# Patient Record
Sex: MALE | NOT HISPANIC OR LATINO
[De-identification: names, ages, dates, MRNs, and addresses within clinical notes are randomized per-mention and may not be internally consistent; named-entity substitution may affect disease eponyms.]

---

## 2017-01-11 ENCOUNTER — CHARTING TRANS (OUTPATIENT)
Dept: OTHER | Age: 22
End: 2017-01-11

## 2017-10-12 ENCOUNTER — CHARTING TRANS (OUTPATIENT)
Dept: OTHER | Age: 22
End: 2017-10-12

## 2017-10-16 ENCOUNTER — CHARTING TRANS (OUTPATIENT)
Dept: OTHER | Age: 22
End: 2017-10-16

## 2017-12-14 ENCOUNTER — CHARTING TRANS (OUTPATIENT)
Dept: OTHER | Age: 22
End: 2017-12-14

## 2018-01-08 ENCOUNTER — HOSPITAL ENCOUNTER (OUTPATIENT)
Age: 23
Discharge: HOME | End: 2018-01-08
Attending: PEDIATRICS
Payer: COMMERCIAL

## 2018-01-08 VITALS
TEMPERATURE: 99 F | HEIGHT: 65 IN | RESPIRATION RATE: 16 BRPM | BODY MASS INDEX: 23.66 KG/M2 | DIASTOLIC BLOOD PRESSURE: 78 MMHG | WEIGHT: 142 LBS | OXYGEN SATURATION: 99 % | SYSTOLIC BLOOD PRESSURE: 129 MMHG | HEART RATE: 82 BPM
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
## 2018-01-08 DIAGNOSIS — S61.258A DOG BITE OF INDEX FINGER, INITIAL ENCOUNTER: Primary | ICD-10-CM

## 2018-01-08 PROCEDURE — 99203 OFFICE O/P NEW LOW 30 MIN: CPT

## 2018-01-08 PROCEDURE — 99202 OFFICE O/P NEW SF 15 MIN: CPT

## 2018-01-08 RX ORDER — DEXTROAMPHETAMINE SACCHARATE, AMPHETAMINE ASPARTATE MONOHYDRATE, DEXTROAMPHETAMINE SULFATE AND AMPHETAMINE SULFATE 2.5; 2.5; 2.5; 2.5 MG/1; MG/1; MG/1; MG/1
10 CAPSULE, EXTENDED RELEASE ORAL EVERY MORNING
COMMUNITY

## 2018-01-08 RX ORDER — AMOXICILLIN AND CLAVULANATE POTASSIUM 875; 125 MG/1; MG/1
1 TABLET, FILM COATED ORAL 2 TIMES DAILY
Qty: 14 TABLET | Refills: 0 | Status: SHIPPED | OUTPATIENT
Start: 2018-01-08 | End: 2018-01-15

## 2018-01-08 NOTE — ED PROVIDER NOTES
Patient Seen in: [facility] Immediate Care In [address] [place] [address]    History   Patient presents with:  Bite (integumentary)    Stated Complaint: Dog Bite    HPI    Here for evaluation of a dog bite.   This occurred last night on his left index finger from his [id number]  [phone]      As needed      Medications Prescribed:  Current Discharge Medication List    START taking these medications    Amoxicillin-Pot Clavulanate 875-125 MG Oral Tab  Take 1 tablet by mouth 2 (two) times daily.   Qty: 14 tablet Refills: 0

## 2018-12-27 RX ORDER — CLINDAMYCIN PHOSPHATE AND BENZOYL PEROXIDE 10; 50 MG/G; MG/G
GEL TOPICAL
Qty: 45 G | Refills: 3 | Status: SHIPPED | OUTPATIENT
Start: 2018-12-27 | End: 2019-07-15 | Stop reason: SDUPTHER

## 2019-07-15 RX ORDER — CLINDAMYCIN PHOSPHATE AND BENZOYL PEROXIDE 10; 50 MG/G; MG/G
GEL TOPICAL
Qty: 45 G | Refills: 0 | Status: SHIPPED | OUTPATIENT
Start: 2019-07-15

## 2019-08-20 PROBLEM — F84.0 AUTISM SPECTRUM DISORDER: Status: ACTIVE | Noted: 2019-08-20

## 2019-08-20 PROBLEM — F41.1 GENERALIZED ANXIETY DISORDER: Status: ACTIVE | Noted: 2019-08-20

## 2019-08-20 PROBLEM — F90.9 ATTENTION DEFICIT HYPERACTIVITY DISORDER (ADHD), UNSPECIFIED ADHD TYPE: Status: ACTIVE | Noted: 2019-08-20

## 2019-08-20 PROBLEM — F44.9 CONVERSION DISORDER: Status: ACTIVE | Noted: 2019-08-20

## 2019-08-20 PROBLEM — F84.0 AUTISM SPECTRUM DISORDER (HCC): Status: ACTIVE | Noted: 2019-08-20

## 2019-09-12 ENCOUNTER — OFFICE VISIT (OUTPATIENT)
Dept: SURGERY | Facility: CLINIC | Age: 24
End: 2019-09-12
Payer: COMMERCIAL

## 2019-09-12 VITALS
BODY MASS INDEX: 25.61 KG/M2 | WEIGHT: 150 LBS | SYSTOLIC BLOOD PRESSURE: 120 MMHG | DIASTOLIC BLOOD PRESSURE: 70 MMHG | HEIGHT: 64 IN | HEART RATE: 84 BPM

## 2019-09-12 DIAGNOSIS — Q27.30 AVM (ARTERIOVENOUS MALFORMATION): Primary | ICD-10-CM

## 2019-09-12 PROCEDURE — 99244 OFF/OP CNSLTJ NEW/EST MOD 40: CPT

## 2019-09-12 NOTE — PROGRESS NOTES
New Pt is here for follow up AVM     Imaging MRI of the brain    Last Month Pt noticed lump on right temple. Pt denies issues with pain around the lump. Pt states that has no issues with swelling.      Review of Systems:    Hand Dominance: right  General: n

## 2019-09-12 NOTE — PROGRESS NOTES
[facility]  Interventional Neuroradiology Clinic Note      Estle Shone, MD  Neurosurgery  [facility], [place]  Primary Care      Date of Service: 9/12/2019    Dear Liban Ramey,     We had the pleasure of seeing Chris Quivers i constitutional symptoms, such as fevers, chills, night sweats, weight loss, chest pain, shortness of breath, gastrointestinal or genitourinary symptoms    Past Medical/Surgical History:  Diagnosis Date   • ADHD    • Anxiety    • Autism spectrum disorder testing. The patient's pupils are equally responsive and reactive to light. Extraocular muscles are intact. There is no evidence of afferent pupillary defect, anisocoria, myosis, or ptosis.   Facial expression and sensation are symmetric and intact bilat growth/enlargement, but will discuss the case at our multidisciplinary cerebrovascular conference at [facility] for consensus.   Additionally, we have recommended CTA head/neck studies for baseline assessment and confirmation of AVM diagnosis t

## 2019-09-16 ENCOUNTER — TELEPHONE (OUTPATIENT)
Dept: SURGERY | Facility: CLINIC | Age: 24
End: 2019-09-16

## 2019-09-16 NOTE — TELEPHONE ENCOUNTER
Called patient, mother answered phone, ok per HIP[employer]. Relayed below information from Radha .   Mother happy for the update

## 2019-09-16 NOTE — TELEPHONE ENCOUNTER
Case was not reviewed at conference on 9/13/2019    Will discuss case after CTA brain + carotids is completed.     He is scheduled to have imaging done on 9/26/19

## 2019-09-26 ENCOUNTER — HOSPITAL ENCOUNTER (OUTPATIENT)
Dept: CT IMAGING | Facility: HOSPITAL | Age: 24
Discharge: HOME | End: 2019-09-26
Payer: COMMERCIAL

## 2019-09-26 DIAGNOSIS — Q27.30 AVM (ARTERIOVENOUS MALFORMATION): ICD-10-CM

## 2019-09-26 LAB — CREAT BLD-MCNC: 0.9 MG/DL (ref 0.7–1.3)

## 2019-09-26 PROCEDURE — 82565 ASSAY OF CREATININE: CPT

## 2019-09-26 PROCEDURE — 70498 CT ANGIOGRAPHY NECK: CPT

## 2019-09-26 PROCEDURE — 70496 CT ANGIOGRAPHY HEAD: CPT

## 2019-10-03 ENCOUNTER — OFFICE VISIT (OUTPATIENT)
Dept: SURGERY | Facility: CLINIC | Age: 24
End: 2019-10-03
Payer: COMMERCIAL

## 2019-10-03 VITALS — SYSTOLIC BLOOD PRESSURE: 118 MMHG | DIASTOLIC BLOOD PRESSURE: 70 MMHG | HEART RATE: 82 BPM

## 2019-10-03 DIAGNOSIS — Q27.30 AVM (ARTERIOVENOUS MALFORMATION): Primary | ICD-10-CM

## 2019-10-03 PROCEDURE — 99212 OFFICE O/P EST SF 10 MIN: CPT | Performed by: PHYSICIAN ASSISTANT

## 2019-10-03 NOTE — PROGRESS NOTES
[facility]  Interventional Neuroradiology Clinic Note        Neurology  [facility]    Glendy Rooney MD  Primary Care      Date of Service:     Dear Mike Yepez,     We had the pleasure of seeing Babs Carey in the [place] at quiet times. This tinnitus does not interfere with ADLs.     The patient denies constitutional symptoms, such as fevers, chills, night sweats, weight loss, chest pain, shortness of breath, gastrointestinal or genitourinary symptoms    Past Medical/Surgi ptosis. Facial expression and sensation are symmetric and intact bilaterally in the V1-V3 distributions. The patient's hearing is grossly intact.   The palate elevates symmetrically and the voice is normal.  Sternocleidomastoid and trapezius muscle streng skiiing, etc.  Call clinic with any concerns such as change in size, pain, or if tinnitus worsens and impedes patient. Follow up with MRA head in 3 years. Thank you for allowing us to participate in the care of this very interesting patient.  Please do n

## 2019-10-03 NOTE — PROGRESS NOTES
Pt is here for follow up to review imaging/ AVM. Imaging: CTA of the brain obtained.      Review of Systems:    Hand Dominance: right  General: no symptoms reported  Neuro: no symptoms reported  Head: headache  Musculoskeletal: no symptoms reported  Car

## 2019-10-04 ENCOUNTER — TELEPHONE (OUTPATIENT)
Dept: SURGERY | Facility: CLINIC | Age: 24
End: 2019-10-04

## 2019-10-04 DIAGNOSIS — Q27.30 AVM (ARTERIOVENOUS MALFORMATION): Primary | ICD-10-CM

## 2019-10-04 NOTE — TELEPHONE ENCOUNTER
Per Dr. Aida Machuca / [facility] neurovascular conference recommendations     \"will follow patient with serial imaging.  Repeat noncontrast MRI/MRA brain at 1 (repeat August / September 2020), 2 (then 2021) , and then at the 4 (2023) year follow up\"    If there is an

## 2019-10-04 NOTE — TELEPHONE ENCOUNTER
Patient reminder placed     Per Dr. Leonardo Quijano / [facility] neurovascular conference recommendations      \"will follow patient with serial imaging.  Repeat noncontrast MRI/MRA brain at 1 (repeat August / September 2020), 2 (then 2021) , and then at the 4 (2023) year

## 2019-12-13 ENCOUNTER — HOSPITAL ENCOUNTER (OUTPATIENT)
Age: 24
Discharge: HOME | End: 2019-12-13
Attending: EMERGENCY MEDICINE
Payer: COMMERCIAL

## 2019-12-13 VITALS
RESPIRATION RATE: 18 BRPM | WEIGHT: 150 LBS | TEMPERATURE: 97 F | SYSTOLIC BLOOD PRESSURE: 119 MMHG | OXYGEN SATURATION: 100 % | BODY MASS INDEX: 25.61 KG/M2 | HEART RATE: 75 BPM | DIASTOLIC BLOOD PRESSURE: 74 MMHG | HEIGHT: 64 IN

## 2019-12-13 DIAGNOSIS — H10.12 ALLERGIC CONJUNCTIVITIS OF LEFT EYE: Primary | ICD-10-CM

## 2019-12-13 PROCEDURE — 99212 OFFICE O/P EST SF 10 MIN: CPT

## 2019-12-13 PROCEDURE — 99213 OFFICE O/P EST LOW 20 MIN: CPT

## 2019-12-13 RX ORDER — KETOTIFEN FUMARATE 0.35 MG/ML
1 SOLUTION/ DROPS OPHTHALMIC 2 TIMES DAILY
Qty: 1 BOTTLE | Refills: 0 | Status: SHIPPED | OUTPATIENT
Start: 2019-12-13 | End: 2020-09-04

## 2019-12-13 NOTE — ED INITIAL ASSESSMENT (HPI)
Pt with redness and drainage to eye  Symptoms started on Wednesday night  Pt reports possible dust flew into eye at work  +drainage and swelling on Thursday  Pt reports symptoms are better today but just wants to make sure because he wears contacts

## 2019-12-14 NOTE — ED PROVIDER NOTES
Patient Seen in: [facility] Immediate Care In [address] [place] [address]    History   Patient presents with:  Eye Problem    Stated Complaint: red eye     HPI    Pt complains of red left eye  for 3 days . no trauma. no uri symptoms. no visual changes.   Associated Gen: pt is alert, no obvious distress  Eyelids: normal  Conjunctiva: normal, white conjunctiva b/l  Cornea: normal  EOMI intact PERRLA  Ant chambers: nl inspection    ENT:  mmm, no lesions  Neck: supple, no LAD  Skin: warm and dry, no rash, no lacera

## 2020-08-03 ENCOUNTER — TELEPHONE (OUTPATIENT)
Dept: SURGERY | Facility: CLINIC | Age: 25
End: 2020-08-03

## 2020-08-03 NOTE — TELEPHONE ENCOUNTER
Orders for imaging MRA/ MRI of the brain have already been placed per referrals for imaging is still open.      Sent to PA team for imaging approval.

## 2020-09-04 PROBLEM — Q27.30 AVM (ARTERIOVENOUS MALFORMATION): Status: ACTIVE | Noted: 2020-09-04

## 2020-09-04 PROBLEM — Q27.30 AVM (ARTERIOVENOUS MALFORMATION) (HCC): Status: ACTIVE | Noted: 2020-09-04

## 2020-11-23 RX ORDER — CLINDAMYCIN PHOSPHATE AND BENZOYL PEROXIDE 10; 50 MG/G; MG/G
GEL TOPICAL
Qty: 45 G | Refills: 0 | OUTPATIENT
Start: 2020-11-23

## 2020-12-10 RX ORDER — CLINDAMYCIN PHOSPHATE AND BENZOYL PEROXIDE 10; 50 MG/G; MG/G
GEL TOPICAL
Qty: 45 G | Refills: 0 | OUTPATIENT
Start: 2020-12-10

## 2021-08-10 ENCOUNTER — TELEPHONE (OUTPATIENT)
Dept: SURGERY | Facility: CLINIC | Age: 26
End: 2021-08-10

## 2021-08-10 DIAGNOSIS — Q27.30 AVM (ARTERIOVENOUS MALFORMATION): Primary | ICD-10-CM

## 2021-08-10 NOTE — TELEPHONE ENCOUNTER
----- Message from Domi Khan RN sent at 8/3/2020 10:12 AM CDT -----  Regarding: Imaging reminder  ROSALVA [facility] [address] Nurse on 8/1/20  Per Dr. Amaury Dawson / [facility] neurovascular conference recommendations :      \"will follow patient with serial

## 2021-09-09 ENCOUNTER — HOSPITAL ENCOUNTER (OUTPATIENT)
Dept: MRI IMAGING | Facility: HOSPITAL | Age: 26
Discharge: HOME | End: 2021-09-09
Payer: MEDICAID

## 2021-09-09 DIAGNOSIS — Q27.30 AVM (ARTERIOVENOUS MALFORMATION): ICD-10-CM

## 2021-09-09 PROCEDURE — 70544 MR ANGIOGRAPHY HEAD W/O DYE: CPT

## 2021-12-13 ENCOUNTER — APPOINTMENT (OUTPATIENT)
Dept: GENERAL RADIOLOGY | Age: 26
End: 2021-12-13
Attending: PHYSICIAN ASSISTANT
Payer: MEDICAID

## 2021-12-13 ENCOUNTER — HOSPITAL ENCOUNTER (OUTPATIENT)
Age: 26
Discharge: HOME | End: 2021-12-13
Payer: MEDICAID

## 2021-12-13 VITALS
DIASTOLIC BLOOD PRESSURE: 66 MMHG | RESPIRATION RATE: 20 BRPM | OXYGEN SATURATION: 100 % | HEART RATE: 84 BPM | TEMPERATURE: 99 F | SYSTOLIC BLOOD PRESSURE: 129 MMHG

## 2021-12-13 DIAGNOSIS — M25.572 ACUTE LEFT ANKLE PAIN: Primary | ICD-10-CM

## 2021-12-13 PROCEDURE — 99213 OFFICE O/P EST LOW 20 MIN: CPT

## 2021-12-13 PROCEDURE — 73610 X-RAY EXAM OF ANKLE: CPT | Performed by: PHYSICIAN ASSISTANT

## 2021-12-13 RX ORDER — IBUPROFEN 600 MG/1
TABLET ORAL
Qty: 20 TABLET | Refills: 0 | Status: SHIPPED | OUTPATIENT
Start: 2021-12-13

## 2021-12-13 NOTE — ED INITIAL ASSESSMENT (HPI)
Left ankle pain for 1 month. No specific injury. \"Stands a lot\" at work. Wearing a brace for support with some relief. + distal CMS.

## 2021-12-13 NOTE — ED PROVIDER NOTES
Patient Seen in: Immediate [facility] [place]    History   Patient presents with:  Leg or Foot Injury: Left ankle might be sprained. Need X-ray - Entered by patient    Stated Complaint: Leg or Foot Injury - Left ankle might be sprained.  Need X-ray    [facility] reviewed and negative except as noted above. PSFH elements reviewed from today and agreed except as otherwise stated in HPI.     Physical Exam     ED Triage Vitals [12/13/21 1423]   /66   Pulse 84   Resp 20   Temp 98.7 °F (37.1 °C)   Temp src Tempo Remainder of musculoskeletal system is grossly intact. There is no obvious deformity. Neurological: Gross motor movement is intact in all 4 extremities. Patient exhibits normal speech. Skin: Skin is normal to inspection. Warm and dry.   No obvious brui

## 2021-12-16 ENCOUNTER — APPOINTMENT (OUTPATIENT)
Dept: URBAN - METROPOLITAN AREA CLINIC 321 | Age: 26
Setting detail: DERMATOLOGY
End: 2021-12-16

## 2021-12-16 DIAGNOSIS — D22 MELANOCYTIC NEVI: ICD-10-CM

## 2021-12-16 DIAGNOSIS — L70.0 ACNE VULGARIS: ICD-10-CM

## 2021-12-16 PROBLEM — D48.5 NEOPLASM OF UNCERTAIN BEHAVIOR OF SKIN: Status: ACTIVE | Noted: 2021-12-16

## 2021-12-16 PROCEDURE — OTHER COUNSELING: OTHER

## 2021-12-16 PROCEDURE — 11104 PUNCH BX SKIN SINGLE LESION: CPT

## 2021-12-16 PROCEDURE — OTHER PRESCRIPTION: OTHER

## 2021-12-16 PROCEDURE — OTHER BIOPSY BY PUNCH METHOD: OTHER

## 2021-12-16 PROCEDURE — 99204 OFFICE O/P NEW MOD 45 MIN: CPT | Mod: 25

## 2021-12-16 PROCEDURE — OTHER PRESCRIPTION MEDICATION MANAGEMENT: OTHER

## 2021-12-16 RX ORDER — CLINDAMYCIN PHOSPHATE AND BENZOYL PEROXIDE 10; 50 MG/G; MG/G
GEL TOPICAL
Qty: 25 | Refills: 3 | Status: ERX | COMMUNITY
Start: 2021-12-16

## 2021-12-16 ASSESSMENT — LOCATION SIMPLE DESCRIPTION DERM
LOCATION SIMPLE: RIGHT CHEEK
LOCATION SIMPLE: NOSE
LOCATION SIMPLE: LEFT FOREHEAD
LOCATION SIMPLE: LEFT CHEEK
LOCATION SIMPLE: RIGHT FOREHEAD

## 2021-12-16 ASSESSMENT — LOCATION DETAILED DESCRIPTION DERM
LOCATION DETAILED: NASAL DORSUM
LOCATION DETAILED: RIGHT MEDIAL FOREHEAD
LOCATION DETAILED: LEFT CENTRAL MALAR CHEEK
LOCATION DETAILED: LEFT FOREHEAD
LOCATION DETAILED: RIGHT CENTRAL MALAR CHEEK

## 2021-12-16 ASSESSMENT — LOCATION ZONE DERM
LOCATION ZONE: FACE
LOCATION ZONE: NOSE

## 2021-12-16 NOTE — PROCEDURE: COUNSELING
Topical Clindamycin Pregnancy And Lactation Text: This medication is Pregnancy Category B and is considered safe during pregnancy. It is unknown if it is excreted in breast milk.
Tazorac Counseling:  Patient advised that medication is irritating and drying.  Patient may need to apply sparingly and wash off after an hour before eventually leaving it on overnight.  The patient verbalized understanding of the proper use and possible adverse effects of tazorac.  All of the patient's questions and concerns were addressed.
Topical Sulfur Applications Counseling: Topical Sulfur Counseling: Patient counseled that this medication may cause skin irritation or allergic reactions.  In the event of skin irritation, the patient was advised to reduce the amount of the drug applied or use it less frequently.   The patient verbalized understanding of the proper use and possible adverse effects of topical sulfur application.  All of the patient's questions and concerns were addressed.
Topical Clindamycin Counseling: Patient counseled that this medication may cause skin irritation or allergic reactions.  In the event of skin irritation, the patient was advised to reduce the amount of the drug applied or use it less frequently.   The patient verbalized understanding of the proper use and possible adverse effects of clindamycin.  All of the patient's questions and concerns were addressed.
Bactrim Counseling:  I discussed with the patient the risks of sulfa antibiotics including but not limited to GI upset, allergic reaction, drug rash, diarrhea, dizziness, photosensitivity, and yeast infections.  Rarely, more serious reactions can occur including but not limited to aplastic anemia, agranulocytosis, methemoglobinemia, blood dyscrasias, liver or kidney failure, lung infiltrates or desquamative/blistering drug rashes.
Topical Retinoids Recommendations: Rec OTC adapalene gel 0.1% (i.e differin) if Rx is not covered.
Topical Sulfur Applications Pregnancy And Lactation Text: This medication is Pregnancy Category C and has an unknown safety profile during pregnancy. It is unknown if this topical medication is excreted in breast milk.
Topical Retinoid counseling:  Patient advised to apply a pea-sized amount only at bedtime and wait 30 minutes after washing their face before applying.  If too drying, patient may add a non-comedogenic moisturizer. The patient verbalized understanding of the proper use and possible adverse effects of retinoids.  All of the patient's questions and concerns were addressed.
Detail Level: Detailed
Tetracycline Pregnancy And Lactation Text: This medication is Pregnancy Category D and not consider safe during pregnancy. It is also excreted in breast milk.
Bpo Recommendations: Recommend panoxyl or cerave 4% BPO wash on face for 30-45 seconds daily or as tolerated (reduce use if drying/irritating to face). Can bleach fabrics/hair.  Recommend 10% BPO wash (i.e panoxyl) for body for 2-3 minutes daily, adjusting frequency/duration as tolerated.
Tazorac Pregnancy And Lactation Text: This medication is not safe during pregnancy. It is unknown if this medication is excreted in breast milk.
Doxycycline Counseling:  Patient counseled regarding possible photosensitivity and increased risk for sunburn.  Patient instructed to avoid sunlight, if possible.  When exposed to sunlight, patients should wear protective clothing, sunglasses, and sunscreen.  The patient was instructed to call the office immediately if the following severe adverse effects occur:  hearing changes, easy bruising/bleeding, severe headache, or vision changes.  The patient verbalized understanding of the proper use and possible adverse effects of doxycycline.  All of the patient's questions and concerns were addressed.
Erythromycin Counseling:  I discussed with the patient the risks of erythromycin including but not limited to GI upset, allergic reaction, drug rash, diarrhea, increase in liver enzymes, and yeast infections.
Tetracycline Counseling: Patient counseled regarding possible photosensitivity and increased risk for sunburn.  Patient instructed to avoid sunlight, if possible.  When exposed to sunlight, patients should wear protective clothing, sunglasses, and sunscreen.  The patient was instructed to call the office immediately if the following severe adverse effects occur:  hearing changes, easy bruising/bleeding, severe headache, or vision changes.  The patient verbalized understanding of the proper use and possible adverse effects of tetracycline.  All of the patient's questions and concerns were addressed. Patient understands to avoid pregnancy while on therapy due to potential birth defects.
Sarecycline Counseling: Patient advised regarding possible photosensitivity and discoloration of the teeth, skin, lips, tongue and gums.  Patient instructed to avoid sunlight, if possible.  When exposed to sunlight, patients should wear protective clothing, sunglasses, and sunscreen.  The patient was instructed to call the office immediately if the following severe adverse effects occur:  hearing changes, easy bruising/bleeding, severe headache, or vision changes.  The patient verbalized understanding of the proper use and possible adverse effects of sarecycline.  All of the patient's questions and concerns were addressed.
Benzoyl Peroxide Counseling: Patient counseled that medicine may cause skin irritation and bleach clothing.  In the event of skin irritation, the patient was advised to reduce the amount of the drug applied or use it less frequently.   The patient verbalized understanding of the proper use and possible adverse effects of benzoyl peroxide.  All of the patient's questions and concerns were addressed.
Dapsone Pregnancy And Lactation Text: This medication is Pregnancy Category C and is not considered safe during pregnancy or breast feeding.
Spironolactone Counseling: Patient advised regarding risks of diarrhea, abdominal pain, hyperkalemia, birth defects (for female patients), liver toxicity and renal toxicity. The patient may need blood work to monitor liver and kidney function and potassium levels while on therapy. The patient verbalized understanding of the proper use and possible adverse effects of spironolactone.  All of the patient's questions and concerns were addressed.
Isotretinoin Counseling: Patient should get monthly blood tests, not donate blood, not drive at night if vision affected, not share medication, and not undergo elective surgery for 6 months after tx completed. Side effects reviewed, pt to contact office should one occur.
High Dose Vitamin A Counseling: Side effects reviewed, pt to contact office should one occur.
Dapsone Counseling: I discussed with the patient the risks of dapsone including but not limited to hemolytic anemia, agranulocytosis, rashes, methemoglobinemia, kidney failure, peripheral neuropathy, headaches, GI upset, and liver toxicity.  Patients who start dapsone require monitoring including baseline LFTs and weekly CBCs for the first month, then every month thereafter.  The patient verbalized understanding of the proper use and possible adverse effects of dapsone.  All of the patient's questions and concerns were addressed.
Isotretinoin Pregnancy And Lactation Text: This medication is Pregnancy Category X and is considered extremely dangerous during pregnancy. It is unknown if it is excreted in breast milk.
Topical Retinoid Pregnancy And Lactation Text: This medication is Pregnancy Category C. It is unknown if this medication is excreted in breast milk.
Spironolactone Pregnancy And Lactation Text: This medication can cause feminization of the male fetus and should be avoided during pregnancy. The active metabolite is also found in breast milk.
High Dose Vitamin A Pregnancy And Lactation Text: High dose vitamin A therapy is contraindicated during pregnancy and breast feeding.
Erythromycin Pregnancy And Lactation Text: This medication is Pregnancy Category B and is considered safe during pregnancy. It is also excreted in breast milk.
Azithromycin Pregnancy And Lactation Text: This medication is considered safe during pregnancy and is also secreted in breast milk.
Doxycycline Pregnancy And Lactation Text: This medication is Pregnancy Category D and not consider safe during pregnancy. It is also excreted in breast milk but is considered safe for shorter treatment courses.
Use Enhanced Medication Counseling?: No
Minocycline Counseling: Patient advised regarding possible photosensitivity and discoloration of the teeth, skin, lips, tongue and gums.  Patient instructed to avoid sunlight, if possible.  When exposed to sunlight, patients should wear protective clothing, sunglasses, and sunscreen.  The patient was instructed to call the office immediately if the following severe adverse effects occur:  hearing changes, easy bruising/bleeding, severe headache, or vision changes.  The patient verbalized understanding of the proper use and possible adverse effects of minocycline.  All of the patient's questions and concerns were addressed.
Bactrim Pregnancy And Lactation Text: This medication is Pregnancy Category D and is known to cause fetal risk.  It is also excreted in breast milk.
Birth Control Pills Pregnancy And Lactation Text: This medication should be avoided if pregnant and for the first 30 days post-partum.
Benzoyl Peroxide Pregnancy And Lactation Text: This medication is Pregnancy Category C. It is unknown if benzoyl peroxide is excreted in breast milk.
Birth Control Pills Counseling: Birth Control Pill Counseling: I discussed with the patient the potential side effects of OCPs including but not limited to increased risk of stroke, heart attack, thrombophlebitis, deep venous thrombosis, hepatic adenomas, breast changes, GI upset, headaches, and depression.  The patient verbalized understanding of the proper use and possible adverse effects of OCPs. All of the patient's questions and concerns were addressed.
Azithromycin Counseling:  I discussed with the patient the risks of azithromycin including but not limited to GI upset, allergic reaction, drug rash, diarrhea, and yeast infections.

## 2021-12-16 NOTE — PROCEDURE: BIOPSY BY PUNCH METHOD
Patient Will Remove Sutures At Home?: No
Post-Care Instructions: I reviewed with the patient in detail post-care instructions. Patient is to keep the biopsy site dry overnight, and then apply vaseline once-twice daily until healed.
Anesthesia Type: 1% lidocaine with epinephrine
Wound Care: Petrolatum
Biopsy Type: H and E
Was A Bandage Applied: Yes
Additional Anesthesia Volume In Cc (Will Not Render If 0): 0
Detail Level: Detailed
Information: Selecting Yes will display possible errors in your note based on the variables you have selected. This validation is only offered as a suggestion for you. PLEASE NOTE THAT THE VALIDATION TEXT WILL BE REMOVED WHEN YOU FINALIZE YOUR NOTE. IF YOU WANT TO FAX A PRELIMINARY NOTE YOU WILL NEED TO TOGGLE THIS TO 'NO' IF YOU DO NOT WANT IT IN YOUR FAXED NOTE.
Hemostasis: None
Punch Size In Mm: 3
Notification Instructions: Patient will be notified of biopsy results. However, patient instructed to call the office if not contacted within 2 weeks.
Billing Type: Third-Party Bill
Home Suture Removal Text: Patient was provided a home suture removal kit and will remove their sutures at home.  If they have any questions or difficulties they will call the office.
Suture Removal: 7 days
Consent: Written consent was obtained and risks were reviewed including but not limited to scarring, infection, bleeding, scabbing, incomplete removal, nerve damage and allergy to anesthesia.
Dressing: bandage
Anesthesia Volume In Cc (Will Not Render If 0): 0.5
Epidermal Sutures: 6-0 Nylon

## 2021-12-23 ENCOUNTER — APPOINTMENT (OUTPATIENT)
Dept: URBAN - METROPOLITAN AREA CLINIC 321 | Age: 26
Setting detail: DERMATOLOGY
End: 2021-12-23

## 2021-12-23 DIAGNOSIS — D22 MELANOCYTIC NEVI: ICD-10-CM

## 2021-12-23 PROBLEM — D22.39 MELANOCYTIC NEVI OF OTHER PARTS OF FACE: Status: ACTIVE | Noted: 2021-12-23

## 2021-12-23 PROCEDURE — OTHER COUNSELING: OTHER

## 2021-12-23 PROCEDURE — OTHER SUTURE REMOVAL (NO GLOBAL PERIOD): OTHER

## 2021-12-23 PROCEDURE — OTHER PATHOLOGY DISCUSSION: OTHER

## 2021-12-23 PROCEDURE — 99212 OFFICE O/P EST SF 10 MIN: CPT

## 2021-12-23 ASSESSMENT — LOCATION ZONE DERM: LOCATION ZONE: NOSE

## 2021-12-23 ASSESSMENT — LOCATION SIMPLE DESCRIPTION DERM: LOCATION SIMPLE: NOSE

## 2021-12-23 ASSESSMENT — LOCATION DETAILED DESCRIPTION DERM: LOCATION DETAILED: NASAL DORSUM

## 2021-12-23 NOTE — PROCEDURE: COUNSELING
Sunscreen Recommendations: Recommend at least SPF 30 use daily. Reapply every 2 hours or more frequently if sweating/exercising or water exposure. Recommend broad spectrum sunscreen. Zinc oxide and titanium dioxide formulations are preferred over \"chemical\" based sunscreens. Wear a wide brimmed hat and sun protective clothing.
Detail Level: Detailed

## 2021-12-27 ENCOUNTER — OFFICE VISIT (OUTPATIENT)
Dept: ORTHOPEDICS CLINIC | Facility: CLINIC | Age: 26
End: 2021-12-27
Payer: MEDICAID

## 2021-12-27 DIAGNOSIS — M25.572 ACUTE LEFT ANKLE PAIN: Primary | ICD-10-CM

## 2021-12-27 PROCEDURE — 99203 OFFICE O/P NEW LOW 30 MIN: CPT | Performed by: PHYSICIAN ASSISTANT

## 2021-12-27 NOTE — PROGRESS NOTES
NURSING INTAKE COMMENTS: Patient presents with: Ankle Pain: left ankle, not sure if there was an injury, has xrays in system, has been for 1 month now, pain at a 9/10       HPI: This 32year old male presents today with complaints of left ankle pain.   He feels generally well, no significant weight loss or weight gain  SKIN: no ulcerated or worrisome skin lesions  EYES:denies blurred vision or double vision  HEENT: denies new nasal congestion, sinus pain or ST  LUNGS: denies shortness of breath  CARDIOVASCU 85 10/06/2020    BUN 16.0 10/06/2020    CREATSERUM 0.92 10/06/2020    GFRNAA 119 09/26/2019    GFRAA 138 09/26/2019        Assessment and Plan:  Diagnoses and all orders for this visit:    Acute left ankle pain  -     PHYSICAL THERAPY - INTERNAL          P

## 2022-07-07 ENCOUNTER — TELEPHONE (OUTPATIENT)
Dept: SURGERY | Facility: CLINIC | Age: 27
End: 2022-07-07

## 2022-07-07 DIAGNOSIS — Q27.30 AVM (ARTERIOVENOUS MALFORMATION): Primary | ICD-10-CM

## 2022-07-07 NOTE — TELEPHONE ENCOUNTER
Mom is concerned that extracranial AVM is growing in size. She denies that the patient is experiencing any new or worsening symptoms. Order placed for repeat MRA brain. Mother was instructed to have patient obtain both MRA brain and previously ordered MRI brain. We can reschedule patient for follow up apt which imaging has been obtained. Thank you.

## 2022-08-11 ENCOUNTER — HOSPITAL ENCOUNTER (OUTPATIENT)
Dept: MRI IMAGING | Facility: HOSPITAL | Age: 27
Discharge: HOME | End: 2022-08-11
Payer: MEDICAID

## 2022-08-11 DIAGNOSIS — Q27.30 AVM (ARTERIOVENOUS MALFORMATION): ICD-10-CM

## 2022-08-11 PROCEDURE — 70551 MRI BRAIN STEM W/O DYE: CPT

## 2022-08-11 PROCEDURE — 70544 MR ANGIOGRAPHY HEAD W/O DYE: CPT

## 2022-08-15 ENCOUNTER — TELEPHONE (OUTPATIENT)
Dept: SURGERY | Facility: CLINIC | Age: 27
End: 2022-08-15

## 2022-08-15 NOTE — TELEPHONE ENCOUNTER
Per QUAN Monk \"Please call patient/mother and schedule follow up with Dr. Ari Walton to discuss most recent imaging findings. \"      Called mother to relay results. Scheduled apt for 9-8 with Dr Ari Walton.

## 2022-09-08 ENCOUNTER — OFFICE VISIT (OUTPATIENT)
Dept: SURGERY | Facility: CLINIC | Age: 27
End: 2022-09-08
Payer: MEDICAID

## 2022-09-08 VITALS — DIASTOLIC BLOOD PRESSURE: 60 MMHG | HEART RATE: 88 BPM | SYSTOLIC BLOOD PRESSURE: 110 MMHG

## 2022-09-08 DIAGNOSIS — Q27.30 AVM (ARTERIOVENOUS MALFORMATION): Primary | ICD-10-CM

## 2022-09-08 PROCEDURE — 3078F DIAST BP <80 MM HG: CPT

## 2022-09-08 PROCEDURE — 3074F SYST BP LT 130 MM HG: CPT

## 2022-09-08 PROCEDURE — 99214 OFFICE O/P EST MOD 30 MIN: CPT

## 2022-09-08 RX ORDER — CLINDAMYCIN AND BENZOYL PEROXIDE 10; 50 MG/G; MG/G
1 GEL TOPICAL AS DIRECTED
COMMUNITY

## 2022-09-08 NOTE — PROGRESS NOTES
Patient here for a follow up visit with Dr. Hiral Shultz to review MRA results. Patient has a history of AVM and pulsatile tinnitus. Last office visit with provider in [address] [place] [address] was on 10/3/19, when Dr. Hiral Shultz recommended f/u MRA in 3 years. Patient completed MRA on 8/11/22.      Review of Systems:    Hand Dominance: right  General: no symptoms reported  Neuro: no symptoms reported  Head: ringing in ears and pulsatile tinnitus  Musculoskeletal: no symptoms reported  Cardiovascular: no symptoms reported  Gastrointestinal: no symptoms reported  Genitourinary: no symptoms reported  Respiratory: no symptoms reported  Eyes: no symptoms reported  Skin: no symptoms reported  Mouth & throat: no symptoms reported  Neck: no symptoms reported  Nose: no symptoms reported  Psychiatric: anxiety and moodiness     MRS-0  Barthel-100

## 2022-09-19 ENCOUNTER — TELEPHONE (OUTPATIENT)
Dept: SURGERY | Facility: CLINIC | Age: 27
End: 2022-09-19

## 2022-09-19 DIAGNOSIS — Q27.30 AVM (ARTERIOVENOUS MALFORMATION): Primary | ICD-10-CM

## 2022-09-19 NOTE — TELEPHONE ENCOUNTER
Patient is scheduled for diagnostic cerebral angiogram on 10/26/22 with Dr Nano Sandra. Y    form completed  Y   Surgery order signed   --- Placed sx on surgery sheet  Y   Placed on outlook calendar  Y   [employer] message sent to patient with sx instructions  N/A Faxed pre-op clearance request to PCP   Y   E-mail sent to [employer]  ---Not avail. Post-op appointments made-Nov. schedule not available as of today. Reminder placed to call to schedule. Y   [place] Medicaid. Routed to PA team to initiate. Y   3 day follow up reminder placed. While discussing pre-procedure steps with patient's mother Lucero Chauhan, she stated that:  -patient works at [employer] about 5 days a week unmasked.   -would it be beneficial for patient to take time off of work, pre-procedure to reduce his risk of evelyn Covid prior to angiogram?  -if provider deems this advisable, then Lucero Chauhan has requested a letter be written and be made accessible via [employer] so that patient may provide letter to manager. Message routed to BAR Collado as inquiry for above.

## 2022-09-19 NOTE — TELEPHONE ENCOUNTER
Per Nursing informed mother that RN will be calling her later Monday 9/19 morning to schedule the angiogram.  Mother appreciative and will wait for call.

## 2022-09-20 NOTE — TELEPHONE ENCOUNTER
Called [facility] [phone] and and spoke with Meenu Nowak.     Prior authorization request completed for:   diagnostic cerebral angiogram   Authorization #NO AUTHORIZATION REQUIRED  Authorization dates: 10/26/22  CPT codes approved: 06997,85217,[id number]  Number of visits/dates of service approved: 0 outpatient  Physician: Dr Marlene Xiong  Location: [facility]

## 2022-09-20 NOTE — TELEPHONE ENCOUNTER
Taking time off prior to angiogram to limit exposure is not something that we generally recommend. People can be asymptomatic with COVID. Also there is conflicting information that the incubation period is anywhere from 2-14 days. This is not an emergent angiogram; however, if his mom would like to do this we can provide a letter, but this is not something that is required. Thank you.

## 2022-09-22 NOTE — TELEPHONE ENCOUNTER
Received feedback from provider regarding pre-angiogram Covid testing and taking time off of work prior to procedure. Received information that no authorization is needed for insurance coverage. Called patient's mother Tyler Frazier and discussed recommendations per BAR Villegas. Per Tyler Frazier, they will consider feedback and will most likely have patient mask at work on days prior to angiogram.      Tyler Frazier thanked Nursing for the discussion and the information and the call was ended. Noted that post-procedure appointment still needs to be scheduled, as Dr. Ashley Mendez November schedule is not yet available.

## 2022-10-13 ENCOUNTER — LAB ENCOUNTER (OUTPATIENT)
Dept: LAB | Facility: HOSPITAL | Age: 27
End: 2022-10-13
Payer: MEDICAID

## 2022-10-13 DIAGNOSIS — Q27.30 AVM (ARTERIOVENOUS MALFORMATION): ICD-10-CM

## 2022-10-13 LAB
ANION GAP SERPL CALC-SCNC: 6 MMOL/L (ref 0–18)
APTT PPP: 30 SECONDS (ref 23.3–35.6)
BASOPHILS # BLD AUTO: 0.1 X10(3) UL (ref 0–0.2)
BASOPHILS NFR BLD AUTO: 1.3 %
BUN BLD-MCNC: 12 MG/DL (ref 7–18)
BUN/CREAT SERPL: 11.9 (ref 10–20)
CALCIUM BLD-MCNC: 9.4 MG/DL (ref 8.5–10.1)
CHLORIDE SERPL-SCNC: 108 MMOL/L (ref 98–112)
CO2 SERPL-SCNC: 27 MMOL/L (ref 21–32)
CREAT BLD-MCNC: 1.01 MG/DL
DEPRECATED RDW RBC AUTO: 40.5 FL (ref 35.1–46.3)
EOSINOPHIL # BLD AUTO: 0.31 X10(3) UL (ref 0–0.7)
EOSINOPHIL NFR BLD AUTO: 4.2 %
ERYTHROCYTE [DISTWIDTH] IN BLOOD BY AUTOMATED COUNT: 12.5 % (ref 11–15)
FASTING STATUS PATIENT QL REPORTED: YES
GFR SERPLBLD BASED ON 1.73 SQ M-ARVRAT: 105 ML/MIN/1.73M2 (ref 60–?)
GLUCOSE BLD-MCNC: 77 MG/DL (ref 70–99)
HCT VFR BLD AUTO: 48.1 %
HGB BLD-MCNC: 15.7 G/DL
IMM GRANULOCYTES # BLD AUTO: 0.01 X10(3) UL (ref 0–1)
IMM GRANULOCYTES NFR BLD: 0.1 %
INR BLD: 0.99 (ref 0.85–1.16)
LYMPHOCYTES # BLD AUTO: 2.54 X10(3) UL (ref 1–4)
LYMPHOCYTES NFR BLD AUTO: 34.1 %
MCH RBC QN AUTO: 28.7 PG (ref 26–34)
MCHC RBC AUTO-ENTMCNC: 32.6 G/DL (ref 31–37)
MCV RBC AUTO: 87.9 FL
MONOCYTES # BLD AUTO: 0.68 X10(3) UL (ref 0.1–1)
MONOCYTES NFR BLD AUTO: 9.1 %
NEUTROPHILS # BLD AUTO: 3.81 X10 (3) UL (ref 1.5–7.7)
NEUTROPHILS # BLD AUTO: 3.81 X10(3) UL (ref 1.5–7.7)
NEUTROPHILS NFR BLD AUTO: 51.2 %
OSMOLALITY SERPL CALC.SUM OF ELEC: 291 MOSM/KG (ref 275–295)
PLATELET # BLD AUTO: 287 10(3)UL (ref 150–450)
POTASSIUM SERPL-SCNC: 4.2 MMOL/L (ref 3.5–5.1)
PROTHROMBIN TIME: 13 SECONDS (ref 11.6–14.8)
RBC # BLD AUTO: 5.47 X10(6)UL
SODIUM SERPL-SCNC: 141 MMOL/L (ref 136–145)
WBC # BLD AUTO: 7.5 X10(3) UL (ref 4–11)

## 2022-10-13 PROCEDURE — 80048 BASIC METABOLIC PNL TOTAL CA: CPT

## 2022-10-13 PROCEDURE — 85610 PROTHROMBIN TIME: CPT

## 2022-10-13 PROCEDURE — 36415 COLL VENOUS BLD VENIPUNCTURE: CPT

## 2022-10-13 PROCEDURE — 85730 THROMBOPLASTIN TIME PARTIAL: CPT

## 2022-10-13 PROCEDURE — 85025 COMPLETE CBC W/AUTO DIFF WBC: CPT

## 2022-10-23 ENCOUNTER — LAB ENCOUNTER (OUTPATIENT)
Dept: LAB | Facility: HOSPITAL | Age: 27
End: 2022-10-23
Payer: MEDICAID

## 2022-10-23 DIAGNOSIS — Q27.30 AVM (ARTERIOVENOUS MALFORMATION): ICD-10-CM

## 2022-10-23 LAB — SARS-COV-2 RNA RESP QL NAA+PROBE: NOT DETECTED

## 2022-10-24 VITALS — WEIGHT: 160 LBS | BODY MASS INDEX: 26.66 KG/M2 | HEIGHT: 65 IN

## 2022-10-26 ENCOUNTER — HOSPITAL ENCOUNTER (OUTPATIENT)
Dept: INTERVENTIONAL RADIOLOGY/VASCULAR | Facility: HOSPITAL | Age: 27
Discharge: HOME | End: 2022-10-26
Payer: MEDICAID

## 2022-11-09 ENCOUNTER — HOSPITAL ENCOUNTER (OUTPATIENT)
Dept: INTERVENTIONAL RADIOLOGY/VASCULAR | Facility: HOSPITAL | Age: 27
Discharge: HOME | End: 2022-11-09
Payer: MEDICAID

## 2022-11-09 ENCOUNTER — TELEPHONE (OUTPATIENT)
Dept: SURGERY | Facility: CLINIC | Age: 27
End: 2022-11-09

## 2022-11-09 VITALS
TEMPERATURE: 99 F | HEART RATE: 70 BPM | OXYGEN SATURATION: 99 % | RESPIRATION RATE: 18 BRPM | SYSTOLIC BLOOD PRESSURE: 99 MMHG | DIASTOLIC BLOOD PRESSURE: 58 MMHG

## 2022-11-09 DIAGNOSIS — Q27.30 AVM (ARTERIOVENOUS MALFORMATION): ICD-10-CM

## 2022-11-09 LAB — SARS-COV-2 RNA RESP QL NAA+PROBE: NOT DETECTED

## 2022-11-09 PROCEDURE — B318YZZ FLUOROSCOPY OF BILATERAL INTERNAL CAROTID ARTERIES USING OTHER CONTRAST: ICD-10-PCS

## 2022-11-09 PROCEDURE — B31DYZZ FLUOROSCOPY OF RIGHT VERTEBRAL ARTERY USING OTHER CONTRAST: ICD-10-PCS

## 2022-11-09 PROCEDURE — B31CYZZ FLUOROSCOPY OF BILATERAL EXTERNAL CAROTID ARTERIES USING OTHER CONTRAST: ICD-10-PCS

## 2022-11-09 PROCEDURE — 99153 MOD SED SAME PHYS/QHP EA: CPT

## 2022-11-09 PROCEDURE — 36224 PLACE CATH CAROTD ART: CPT

## 2022-11-09 PROCEDURE — 36226 PLACE CATH VERTEBRAL ART: CPT

## 2022-11-09 PROCEDURE — 76377 3D RENDER W/INTRP POSTPROCES: CPT

## 2022-11-09 PROCEDURE — 99152 MOD SED SAME PHYS/QHP 5/>YRS: CPT

## 2022-11-09 PROCEDURE — 36227 PLACE CATH XTRNL CAROTID: CPT

## 2022-11-09 RX ORDER — LIDOCAINE HYDROCHLORIDE 10 MG/ML
INJECTION, SOLUTION INFILTRATION; PERINEURAL
Status: DISCONTINUED
Start: 2022-11-09 | End: 2022-11-09 | Stop reason: WASHOUT

## 2022-11-09 RX ORDER — NITROGLYCERIN 20 MG/100ML
INJECTION INTRAVENOUS
Status: COMPLETED
Start: 2022-11-09 | End: 2022-11-09

## 2022-11-09 RX ORDER — HEPARIN SODIUM 1000 [USP'U]/ML
INJECTION, SOLUTION INTRAVENOUS; SUBCUTANEOUS
Status: DISCONTINUED
Start: 2022-11-09 | End: 2022-11-09 | Stop reason: WASHOUT

## 2022-11-09 RX ORDER — HEPARIN SODIUM 5000 [USP'U]/ML
INJECTION, SOLUTION INTRAVENOUS; SUBCUTANEOUS
Status: COMPLETED
Start: 2022-11-09 | End: 2022-11-09

## 2022-11-09 RX ORDER — LIDOCAINE AND PRILOCAINE 25; 25 MG/G; MG/G
CREAM TOPICAL
Status: COMPLETED
Start: 2022-11-09 | End: 2022-11-09

## 2022-11-09 RX ORDER — VERAPAMIL HYDROCHLORIDE 2.5 MG/ML
INJECTION, SOLUTION INTRAVENOUS
Status: COMPLETED
Start: 2022-11-09 | End: 2022-11-09

## 2022-11-09 RX ORDER — MIDAZOLAM HYDROCHLORIDE 1 MG/ML
INJECTION INTRAMUSCULAR; INTRAVENOUS
Status: COMPLETED
Start: 2022-11-09 | End: 2022-11-09

## 2022-11-09 RX ORDER — IODIXANOL 320 MG/ML
250 INJECTION, SOLUTION INTRAVASCULAR
Status: COMPLETED | OUTPATIENT
Start: 2022-11-09 | End: 2022-11-09

## 2022-11-09 RX ADMIN — IODIXANOL 150 ML: 320 INJECTION, SOLUTION INTRAVASCULAR at 10:45:00

## 2022-11-09 NOTE — IVS NOTE
Patient discharge home post cerebral angiogram. Pt is able to sit up and ambulate without difficulty. Pt's vital signs are stable. Right wrist procedural access site is dry and intact with no signs and symptoms of bleeding or hematoma. Pt tolerated food/fluids. Dr. Bridgett Morales spoke to pt/family post procedure. Instructions provided and pt/family verbalized understanding. PIV removed. Patient discharge in wheelchair with all belongings. Mother driving home.

## 2022-11-22 ENCOUNTER — HOSPITAL ENCOUNTER (OUTPATIENT)
Age: 27
Discharge: HOME | End: 2022-11-22
Payer: MEDICAID

## 2022-11-22 VITALS
HEART RATE: 112 BPM | WEIGHT: 166.63 LBS | BODY MASS INDEX: 26.78 KG/M2 | SYSTOLIC BLOOD PRESSURE: 120 MMHG | HEIGHT: 66 IN | TEMPERATURE: 102 F | DIASTOLIC BLOOD PRESSURE: 83 MMHG | RESPIRATION RATE: 18 BRPM | OXYGEN SATURATION: 97 %

## 2022-11-22 DIAGNOSIS — R50.9 FEVER: ICD-10-CM

## 2022-11-22 DIAGNOSIS — J11.1 INFLUENZA: Primary | ICD-10-CM

## 2022-11-22 LAB
POCT INFLUENZA A: POSITIVE
POCT INFLUENZA B: NEGATIVE

## 2022-11-22 PROCEDURE — 99213 OFFICE O/P EST LOW 20 MIN: CPT

## 2022-11-22 PROCEDURE — 87502 INFLUENZA DNA AMP PROBE: CPT

## 2022-11-22 RX ORDER — BENZONATATE 100 MG/1
100 CAPSULE ORAL 3 TIMES DAILY PRN
Qty: 30 CAPSULE | Refills: 0 | Status: SHIPPED | OUTPATIENT
Start: 2022-11-22 | End: 2022-12-22

## 2022-11-22 RX ORDER — IBUPROFEN 600 MG/1
600 TABLET ORAL ONCE
Status: COMPLETED | OUTPATIENT
Start: 2022-11-22 | End: 2022-11-22

## 2022-11-22 NOTE — ED INITIAL ASSESSMENT (HPI)
Pt here w c/o stuffy nose, sore throat, fever, cough, post tussive emesis once. Took Tylenol last night.

## 2022-11-22 NOTE — DISCHARGE INSTRUCTIONS
Influenza A test is positive. There are no signs of infection on physical exam.  This is a viral illness. Please be sure to drink plenty of fluids, use Tylenol and Motrin for pain or fever. Use Flonase and Mucinex for congestion. If you develop any respiratory complaints, fever that does not improve with medications or any other concerning complaints you should go to the emergency department. Otherwise follow up with your primary care provider.

## 2022-11-25 ENCOUNTER — OFFICE VISIT (OUTPATIENT)
Dept: FAMILY MEDICINE CLINIC | Facility: CLINIC | Age: 27
End: 2022-11-25
Payer: MEDICAID

## 2022-11-25 VITALS
OXYGEN SATURATION: 97 % | BODY MASS INDEX: 26.92 KG/M2 | RESPIRATION RATE: 18 BRPM | WEIGHT: 167.5 LBS | TEMPERATURE: 98 F | HEART RATE: 97 BPM | DIASTOLIC BLOOD PRESSURE: 82 MMHG | HEIGHT: 66 IN | SYSTOLIC BLOOD PRESSURE: 139 MMHG

## 2022-11-25 DIAGNOSIS — J11.1 INFLUENZA: ICD-10-CM

## 2022-11-25 DIAGNOSIS — H92.02 OTALGIA, LEFT: Primary | ICD-10-CM

## 2022-11-25 DIAGNOSIS — Z53.21 PATIENT LEFT WITHOUT BEING SEEN: Primary | ICD-10-CM

## 2022-11-25 PROCEDURE — 3008F BODY MASS INDEX DOCD: CPT | Performed by: PHYSICIAN ASSISTANT

## 2022-11-25 PROCEDURE — 3075F SYST BP GE 130 - 139MM HG: CPT | Performed by: PHYSICIAN ASSISTANT

## 2022-11-25 PROCEDURE — 3079F DIAST BP 80-89 MM HG: CPT | Performed by: PHYSICIAN ASSISTANT

## 2022-11-25 PROCEDURE — 99213 OFFICE O/P EST LOW 20 MIN: CPT | Performed by: PHYSICIAN ASSISTANT

## 2022-12-13 ENCOUNTER — TELEPHONE (OUTPATIENT)
Dept: SURGERY | Facility: CLINIC | Age: 27
End: 2022-12-13

## 2022-12-13 NOTE — TELEPHONE ENCOUNTER
Per Kimberly Rios, contacted pts mother to inform her that Dr. Donn Platt is in hospital with an emergent case and will be unable to do virtual visit this afternoon. Dr. Donn Platt will do phone visit Thursday afternoon (12.16.22) with both pt and mother. Mother verbalized understanding and was appreciative of call.

## 2022-12-13 NOTE — TELEPHONE ENCOUNTER
Per BILLY Barboza:  Dr. Donn Platt is currently in the hospital with an emergent case and patient's 12/13 1400 appoitment must be rescheduled for a virtual visit. Called patient's mother and provided above information, and Alex Valencia acknowledged, stated that they are available today up until 1700, and thanked Nursing for the call. Nursing contacted PSR to inform her of above. Patient to be rescheduled to a 1600 virtual appointment today with Dr. Donn Platt.

## 2022-12-14 ENCOUNTER — TELEPHONE (OUTPATIENT)
Dept: SURGERY | Facility: CLINIC | Age: 27
End: 2022-12-14

## 2022-12-14 DIAGNOSIS — Q27.30 AVM (ARTERIOVENOUS MALFORMATION): Primary | ICD-10-CM

## 2022-12-14 DIAGNOSIS — I77.0 AVF (ARTERIOVENOUS FISTULA) (HCC): ICD-10-CM

## 2022-12-14 NOTE — TELEPHONE ENCOUNTER
Received notification from provider that discussion was completed with Dr. Sylvia Andrade and patient/his mother. Anticipate scheduling procedure with Dr. Sylvia Andrade when his January schedule becomes available. Reminder placed to appear in 1 week.

## 2022-12-14 NOTE — TELEPHONE ENCOUNTER
Dr. Nano Sandra spoke with patient and mother regarding treatment of his superficial right frontal-parietal extracranial AVM/AVF. They are agreeable to procedure. Please schedule patient for percutaneous or transverse embolization of the above with Dr. Nano Sandra some time in Jan/Feb 2023. This will require general anesthesia. He will also require repeat labs (CBC, BMP, PT/INR, PTT), as well as a repeat COVID test prior to procedure. Let me know if you have any further questions. Thank you!

## 2022-12-15 NOTE — TELEPHONE ENCOUNTER
Dr. Lissette Santiago January calendar has opened up so that patient's procedure may be rescheduled. Date available for patient's procedure in January:  1/9/23. Routed to surgery scheduler to initiate phone call.

## 2022-12-17 ENCOUNTER — PATIENT MESSAGE (OUTPATIENT)
Dept: SURGERY | Facility: CLINIC | Age: 27
End: 2022-12-17

## 2022-12-19 NOTE — TELEPHONE ENCOUNTER
Prior Authorization for inpatient surgery initiated with [facility] [phone]  Requesting coverage for: AVM/AVF  Date of Service: 1/9/2023   Inpatient days requested:1  CPT codes: 01368,08612    Request for surgery pending review, pending reference #[id number]. Clinical notes submitted by [phone], confirmation received.      This will take 96 hours to complete

## 2022-12-19 NOTE — TELEPHONE ENCOUNTER
Received notification that patient has been scheduled to undergo procedure on 23. Patient is scheduled for AVM/AVF embolization on 23 with Dr. Bola Vazquez. Y  Neuro IR (IVS) order placed  Y  Pre-op lab orders placed  Y [email or web address] message sent with pre-op instructions  Y  Faxed pre-op clearance request to PCP Dr. Hiral Moreno. Y  Procedure placed in [place] calendar-Marj YU  Emailed [facility] group in [place] with procedure information including name/, MD, date, time, dx, sx, and location. Y  Blocked sx in Epic schedule-Marj  --  Placed on surgery sheet-Marj YU  Scheduled post-op appointment-23 at 1000 with Dr. Raymundo COLEMAN routed to PA team for initiation.      CPT Codes:  Q366983, [id number], [id number], [id number], [id number], [id number], [id number], [id number]

## 2022-12-19 NOTE — TELEPHONE ENCOUNTER
Noted that patient's mother Cal Page has messaged inquiring about confirmation of surgery date with Dr. Cesar Vega. Message sent to patient's POA via [employer]. Called patient's mother to discuss pre-op instructions. Please see schedule surgery TE for additional information.

## 2022-12-22 ENCOUNTER — LAB ENCOUNTER (OUTPATIENT)
Dept: LAB | Facility: HOSPITAL | Age: 27
End: 2022-12-22
Payer: MEDICAID

## 2022-12-22 DIAGNOSIS — Q27.30 AVM (ARTERIOVENOUS MALFORMATION): ICD-10-CM

## 2022-12-22 DIAGNOSIS — I77.0 AVF (ARTERIOVENOUS FISTULA) (HCC): ICD-10-CM

## 2022-12-22 LAB
ALBUMIN SERPL-MCNC: 3.8 G/DL (ref 3.4–5)
ALBUMIN/GLOB SERPL: 1 {RATIO} (ref 1–2)
ALP LIVER SERPL-CCNC: 89 U/L
ALT SERPL-CCNC: 32 U/L
ANION GAP SERPL CALC-SCNC: 4 MMOL/L (ref 0–18)
APTT PPP: 28.8 SECONDS (ref 23.3–35.6)
AST SERPL-CCNC: 15 U/L (ref 15–37)
BASOPHILS # BLD AUTO: 0.1 X10(3) UL (ref 0–0.2)
BASOPHILS NFR BLD AUTO: 1.5 %
BILIRUB SERPL-MCNC: 0.3 MG/DL (ref 0.1–2)
BUN BLD-MCNC: 10 MG/DL (ref 7–18)
BUN/CREAT SERPL: 10.5 (ref 10–20)
CALCIUM BLD-MCNC: 9.2 MG/DL (ref 8.5–10.1)
CHLORIDE SERPL-SCNC: 108 MMOL/L (ref 98–112)
CO2 SERPL-SCNC: 29 MMOL/L (ref 21–32)
CREAT BLD-MCNC: 0.95 MG/DL
DEPRECATED RDW RBC AUTO: 41.5 FL (ref 35.1–46.3)
EOSINOPHIL # BLD AUTO: 0.35 X10(3) UL (ref 0–0.7)
EOSINOPHIL NFR BLD AUTO: 5.2 %
ERYTHROCYTE [DISTWIDTH] IN BLOOD BY AUTOMATED COUNT: 13.1 % (ref 11–15)
FASTING STATUS PATIENT QL REPORTED: YES
GFR SERPLBLD BASED ON 1.73 SQ M-ARVRAT: 113 ML/MIN/1.73M2 (ref 60–?)
GLOBULIN PLAS-MCNC: 3.8 G/DL (ref 2.8–4.4)
GLUCOSE BLD-MCNC: 80 MG/DL (ref 70–99)
HCT VFR BLD AUTO: 45.3 %
HGB BLD-MCNC: 15.4 G/DL
IMM GRANULOCYTES # BLD AUTO: 0.02 X10(3) UL (ref 0–1)
IMM GRANULOCYTES NFR BLD: 0.3 %
INR BLD: 0.92 (ref 0.85–1.16)
LYMPHOCYTES # BLD AUTO: 2.56 X10(3) UL (ref 1–4)
LYMPHOCYTES NFR BLD AUTO: 38 %
MCH RBC QN AUTO: 29.5 PG (ref 26–34)
MCHC RBC AUTO-ENTMCNC: 34 G/DL (ref 31–37)
MCV RBC AUTO: 86.8 FL
MONOCYTES # BLD AUTO: 0.68 X10(3) UL (ref 0.1–1)
MONOCYTES NFR BLD AUTO: 10.1 %
NEUTROPHILS # BLD AUTO: 3.02 X10 (3) UL (ref 1.5–7.7)
NEUTROPHILS # BLD AUTO: 3.02 X10(3) UL (ref 1.5–7.7)
NEUTROPHILS NFR BLD AUTO: 44.9 %
OSMOLALITY SERPL CALC.SUM OF ELEC: 290 MOSM/KG (ref 275–295)
PLATELET # BLD AUTO: 213 10(3)UL (ref 150–450)
POTASSIUM SERPL-SCNC: 3.9 MMOL/L (ref 3.5–5.1)
PROT SERPL-MCNC: 7.6 G/DL (ref 6.4–8.2)
PROTHROMBIN TIME: 12.4 SECONDS (ref 11.6–14.8)
RBC # BLD AUTO: 5.22 X10(6)UL
SODIUM SERPL-SCNC: 141 MMOL/L (ref 136–145)
WBC # BLD AUTO: 6.7 X10(3) UL (ref 4–11)

## 2022-12-22 PROCEDURE — 85610 PROTHROMBIN TIME: CPT

## 2022-12-22 PROCEDURE — 36415 COLL VENOUS BLD VENIPUNCTURE: CPT

## 2022-12-22 PROCEDURE — 85025 COMPLETE CBC W/AUTO DIFF WBC: CPT

## 2022-12-22 PROCEDURE — 85730 THROMBOPLASTIN TIME PARTIAL: CPT

## 2022-12-22 PROCEDURE — 80053 COMPREHEN METABOLIC PANEL: CPT

## 2022-12-29 RX ORDER — MULTIVIT-MIN/IRON FUM/FOLIC AC 7.5 MG-4
1 TABLET ORAL DAILY
COMMUNITY

## 2023-01-06 ENCOUNTER — LAB ENCOUNTER (OUTPATIENT)
Dept: LAB | Facility: HOSPITAL | Age: 28
End: 2023-01-06
Payer: MEDICAID

## 2023-01-06 DIAGNOSIS — I77.0 AVF (ARTERIOVENOUS FISTULA) (HCC): ICD-10-CM

## 2023-01-06 DIAGNOSIS — Q27.30 AVM (ARTERIOVENOUS MALFORMATION): ICD-10-CM

## 2023-01-07 LAB — SARS-COV-2 RNA RESP QL NAA+PROBE: NOT DETECTED

## 2023-01-08 ENCOUNTER — ANESTHESIA EVENT (OUTPATIENT)
Dept: INTERVENTIONAL RADIOLOGY/VASCULAR | Facility: HOSPITAL | Age: 28
End: 2023-01-08
Payer: MEDICAID

## 2023-01-09 ENCOUNTER — ANESTHESIA (OUTPATIENT)
Dept: INTERVENTIONAL RADIOLOGY/VASCULAR | Facility: HOSPITAL | Age: 28
End: 2023-01-09
Payer: MEDICAID

## 2023-01-09 ENCOUNTER — HOSPITAL ENCOUNTER (INPATIENT)
Dept: INTERVENTIONAL RADIOLOGY/VASCULAR | Facility: HOSPITAL | Age: 28
LOS: 1 days | Discharge: HOME | End: 2023-01-10
Payer: MEDICAID

## 2023-01-09 DIAGNOSIS — Q27.30 AVM (ARTERIOVENOUS MALFORMATION): ICD-10-CM

## 2023-01-09 LAB
INR: 1 (ref 0.8–1.3)
ISTAT ACTIVATED CLOTTING TIME: 119 SECONDS (ref 74–137)
ISTAT ACTIVATED CLOTTING TIME: 125 SECONDS (ref 74–137)
ISTAT ACTIVATED CLOTTING TIME: 137 SECONDS (ref 74–137)

## 2023-01-09 PROCEDURE — 76942 ECHO GUIDE FOR BIOPSY: CPT | Performed by: STUDENT IN AN ORGANIZED HEALTH CARE EDUCATION/TRAINING PROGRAM

## 2023-01-09 PROCEDURE — B315YZZ FLUOROSCOPY OF BILATERAL COMMON CAROTID ARTERIES USING OTHER CONTRAST: ICD-10-PCS

## 2023-01-09 PROCEDURE — 03LY3DZ OCCLUSION OF UPPER ARTERY WITH INTRALUMINAL DEVICE, PERCUTANEOUS APPROACH: ICD-10-PCS

## 2023-01-09 PROCEDURE — 99291 CRITICAL CARE FIRST HOUR: CPT | Performed by: INTERNAL MEDICINE

## 2023-01-09 PROCEDURE — B31CYZZ FLUOROSCOPY OF BILATERAL EXTERNAL CAROTID ARTERIES USING OTHER CONTRAST: ICD-10-PCS

## 2023-01-09 RX ORDER — PHENYLEPHRINE HCL 10 MG/ML
VIAL (ML) INJECTION AS NEEDED
Status: DISCONTINUED | OUTPATIENT
Start: 2023-01-09 | End: 2023-01-09 | Stop reason: SURG

## 2023-01-09 RX ORDER — IODIXANOL 320 MG/ML
250 INJECTION, SOLUTION INTRAVASCULAR
Status: COMPLETED | OUTPATIENT
Start: 2023-01-09 | End: 2023-01-09

## 2023-01-09 RX ORDER — SODIUM CHLORIDE, SODIUM LACTATE, POTASSIUM CHLORIDE, CALCIUM CHLORIDE 600; 310; 30; 20 MG/100ML; MG/100ML; MG/100ML; MG/100ML
INJECTION, SOLUTION INTRAVENOUS CONTINUOUS
Status: DISCONTINUED | OUTPATIENT
Start: 2023-01-09 | End: 2023-01-10

## 2023-01-09 RX ORDER — NITROGLYCERIN 20 MG/100ML
INJECTION INTRAVENOUS
Status: COMPLETED
Start: 2023-01-09 | End: 2023-01-09

## 2023-01-09 RX ORDER — MORPHINE SULFATE 2 MG/ML
1 INJECTION, SOLUTION INTRAMUSCULAR; INTRAVENOUS EVERY 2 HOUR PRN
Status: DISCONTINUED | OUTPATIENT
Start: 2023-01-09 | End: 2023-01-10

## 2023-01-09 RX ORDER — ONDANSETRON 2 MG/ML
INJECTION INTRAMUSCULAR; INTRAVENOUS AS NEEDED
Status: DISCONTINUED | OUTPATIENT
Start: 2023-01-09 | End: 2023-01-09 | Stop reason: SURG

## 2023-01-09 RX ORDER — CEFAZOLIN SODIUM/WATER 2 G/20 ML
SYRINGE (ML) INTRAVENOUS AS NEEDED
Status: DISCONTINUED | OUTPATIENT
Start: 2023-01-09 | End: 2023-01-09 | Stop reason: SURG

## 2023-01-09 RX ORDER — ONDANSETRON 2 MG/ML
4 INJECTION INTRAMUSCULAR; INTRAVENOUS EVERY 6 HOURS PRN
Status: DISCONTINUED | OUTPATIENT
Start: 2023-01-09 | End: 2023-01-10 | Stop reason: ALTCHOICE

## 2023-01-09 RX ORDER — MORPHINE SULFATE 2 MG/ML
2 INJECTION, SOLUTION INTRAMUSCULAR; INTRAVENOUS EVERY 2 HOUR PRN
Status: DISCONTINUED | OUTPATIENT
Start: 2023-01-09 | End: 2023-01-10

## 2023-01-09 RX ORDER — HYDROMORPHONE HYDROCHLORIDE 1 MG/ML
0.2 INJECTION, SOLUTION INTRAMUSCULAR; INTRAVENOUS; SUBCUTANEOUS EVERY 5 MIN PRN
Status: ACTIVE | OUTPATIENT
Start: 2023-01-09 | End: 2023-01-09

## 2023-01-09 RX ORDER — ACETAMINOPHEN 325 MG/1
650 TABLET ORAL EVERY 4 HOURS PRN
Status: DISCONTINUED | OUTPATIENT
Start: 2023-01-09 | End: 2023-01-10

## 2023-01-09 RX ORDER — LABETALOL HYDROCHLORIDE 5 MG/ML
10 INJECTION, SOLUTION INTRAVENOUS EVERY 10 MIN PRN
Status: DISCONTINUED | OUTPATIENT
Start: 2023-01-09 | End: 2023-01-10

## 2023-01-09 RX ORDER — ROCURONIUM BROMIDE 10 MG/ML
INJECTION, SOLUTION INTRAVENOUS AS NEEDED
Status: DISCONTINUED | OUTPATIENT
Start: 2023-01-09 | End: 2023-01-09 | Stop reason: SURG

## 2023-01-09 RX ORDER — HEPARIN SODIUM 1000 [USP'U]/ML
INJECTION, SOLUTION INTRAVENOUS; SUBCUTANEOUS
Status: COMPLETED
Start: 2023-01-09 | End: 2023-01-09

## 2023-01-09 RX ORDER — HYDROMORPHONE HYDROCHLORIDE 1 MG/ML
0.4 INJECTION, SOLUTION INTRAMUSCULAR; INTRAVENOUS; SUBCUTANEOUS EVERY 5 MIN PRN
Status: ACTIVE | OUTPATIENT
Start: 2023-01-09 | End: 2023-01-09

## 2023-01-09 RX ORDER — PROCHLORPERAZINE EDISYLATE 5 MG/ML
5 INJECTION INTRAMUSCULAR; INTRAVENOUS EVERY 8 HOURS PRN
Status: DISCONTINUED | OUTPATIENT
Start: 2023-01-09 | End: 2023-01-10 | Stop reason: ALTCHOICE

## 2023-01-09 RX ORDER — LIDOCAINE HYDROCHLORIDE 10 MG/ML
INJECTION, SOLUTION EPIDURAL; INFILTRATION; INTRACAUDAL; PERINEURAL AS NEEDED
Status: DISCONTINUED | OUTPATIENT
Start: 2023-01-09 | End: 2023-01-09 | Stop reason: SURG

## 2023-01-09 RX ORDER — SODIUM CHLORIDE 9 MG/ML
INJECTION, SOLUTION INTRAVENOUS CONTINUOUS PRN
Status: DISCONTINUED | OUTPATIENT
Start: 2023-01-09 | End: 2023-01-09 | Stop reason: SURG

## 2023-01-09 RX ORDER — PROCHLORPERAZINE EDISYLATE 5 MG/ML
5 INJECTION INTRAMUSCULAR; INTRAVENOUS EVERY 8 HOURS PRN
Status: DISCONTINUED | OUTPATIENT
Start: 2023-01-09 | End: 2023-01-10

## 2023-01-09 RX ORDER — VERAPAMIL HYDROCHLORIDE 2.5 MG/ML
INJECTION, SOLUTION INTRAVENOUS
Status: COMPLETED
Start: 2023-01-09 | End: 2023-01-09

## 2023-01-09 RX ORDER — SODIUM CHLORIDE 9 MG/ML
INJECTION, SOLUTION INTRAVENOUS CONTINUOUS
Status: DISCONTINUED | OUTPATIENT
Start: 2023-01-09 | End: 2023-01-10

## 2023-01-09 RX ORDER — LIDOCAINE HYDROCHLORIDE 10 MG/ML
INJECTION, SOLUTION INFILTRATION; PERINEURAL
Status: COMPLETED
Start: 2023-01-09 | End: 2023-01-09

## 2023-01-09 RX ORDER — NALOXONE HYDROCHLORIDE 0.4 MG/ML
80 INJECTION, SOLUTION INTRAMUSCULAR; INTRAVENOUS; SUBCUTANEOUS AS NEEDED
Status: ACTIVE | OUTPATIENT
Start: 2023-01-09 | End: 2023-01-09

## 2023-01-09 RX ORDER — HYDROMORPHONE HYDROCHLORIDE 1 MG/ML
0.6 INJECTION, SOLUTION INTRAMUSCULAR; INTRAVENOUS; SUBCUTANEOUS EVERY 5 MIN PRN
Status: ACTIVE | OUTPATIENT
Start: 2023-01-09 | End: 2023-01-09

## 2023-01-09 RX ORDER — CEFAZOLIN SODIUM/WATER 2 G/20 ML
SYRINGE (ML) INTRAVENOUS
Status: COMPLETED
Start: 2023-01-09 | End: 2023-01-09

## 2023-01-09 RX ORDER — ONDANSETRON 2 MG/ML
4 INJECTION INTRAMUSCULAR; INTRAVENOUS EVERY 6 HOURS PRN
Status: DISCONTINUED | OUTPATIENT
Start: 2023-01-09 | End: 2023-01-10

## 2023-01-09 RX ORDER — ACETAMINOPHEN 650 MG/1
650 SUPPOSITORY RECTAL EVERY 4 HOURS PRN
Status: DISCONTINUED | OUTPATIENT
Start: 2023-01-09 | End: 2023-01-10

## 2023-01-09 RX ADMIN — PHENYLEPHRINE HCL 100 MCG: 10 MG/ML VIAL (ML) INJECTION at 09:18:00

## 2023-01-09 RX ADMIN — PHENYLEPHRINE HCL 100 MCG: 10 MG/ML VIAL (ML) INJECTION at 09:39:00

## 2023-01-09 RX ADMIN — SODIUM CHLORIDE: 9 INJECTION, SOLUTION INTRAVENOUS at 16:00:00

## 2023-01-09 RX ADMIN — ROCURONIUM BROMIDE 20 MG: 10 INJECTION, SOLUTION INTRAVENOUS at 10:40:00

## 2023-01-09 RX ADMIN — ROCURONIUM BROMIDE 20 MG: 10 INJECTION, SOLUTION INTRAVENOUS at 09:56:00

## 2023-01-09 RX ADMIN — SODIUM CHLORIDE: 9 INJECTION, SOLUTION INTRAVENOUS at 09:00:00

## 2023-01-09 RX ADMIN — PHENYLEPHRINE HCL 100 MCG: 10 MG/ML VIAL (ML) INJECTION at 09:07:00

## 2023-01-09 RX ADMIN — CEFAZOLIN SODIUM/WATER 2 G: 2 G/20 ML SYRINGE (ML) INTRAVENOUS at 09:20:00

## 2023-01-09 RX ADMIN — PHENYLEPHRINE HCL 100 MCG: 10 MG/ML VIAL (ML) INJECTION at 10:16:00

## 2023-01-09 RX ADMIN — IODIXANOL 150 ML: 320 INJECTION, SOLUTION INTRAVASCULAR at 13:16:00

## 2023-01-09 RX ADMIN — PHENYLEPHRINE HCL 100 MCG: 10 MG/ML VIAL (ML) INJECTION at 09:00:00

## 2023-01-09 RX ADMIN — ROCURONIUM BROMIDE 20 MG: 10 INJECTION, SOLUTION INTRAVENOUS at 11:58:00

## 2023-01-09 RX ADMIN — LIDOCAINE HYDROCHLORIDE 5 ML: 10 INJECTION, SOLUTION EPIDURAL; INFILTRATION; INTRACAUDAL; PERINEURAL at 08:50:00

## 2023-01-09 RX ADMIN — SODIUM CHLORIDE: 9 INJECTION, SOLUTION INTRAVENOUS at 13:23:00

## 2023-01-09 RX ADMIN — ONDANSETRON 4 MG: 2 INJECTION INTRAMUSCULAR; INTRAVENOUS at 12:50:00

## 2023-01-09 RX ADMIN — ROCURONIUM BROMIDE 20 MG: 10 INJECTION, SOLUTION INTRAVENOUS at 11:20:00

## 2023-01-09 RX ADMIN — PHENYLEPHRINE HCL 100 MCG: 10 MG/ML VIAL (ML) INJECTION at 10:15:00

## 2023-01-09 RX ADMIN — ROCURONIUM BROMIDE 50 MG: 10 INJECTION, SOLUTION INTRAVENOUS at 08:50:00

## 2023-01-09 RX ADMIN — ACETAMINOPHEN 650 MG: 325 TABLET ORAL at 17:16:00

## 2023-01-09 RX ADMIN — SODIUM CHLORIDE: 9 INJECTION, SOLUTION INTRAVENOUS at 08:44:00

## 2023-01-09 NOTE — PLAN OF CARE
Pt alert and oriented x4. Pt able to voice needs and follows commands. Pt with pain management in place for headaches. Pt with mom in room. Pt and Audrey Parnell states mom is POA. Told mom, to always bring copy of POA paperwork with hospitalizations. Pt eating and tolerating meal well. Continuing plan of care, pt's call light at bedside. Problem: Patient/Family Goals  Goal: Patient/Family Long Term Goal  Description: Patient's Long Term Goal: Anticipation for discharge to home    Interventions:  - PT/OT ordered.   - See additional Care Plan goals for specific interventions  Outcome: Progressing  Goal: Patient/Family Short Term Goal  Description: Patient's Short Term Goal: PT/OT    Interventions:   - Pt/Ot order in place.  - See additional Care Plan goals for specific interventions  Outcome: Progressing

## 2023-01-09 NOTE — PROCEDURES
[facility]  Interventional Neuroradiology Procedure Note      Procedure: Percutaneous NBCA Embolization of Right Frontal-Parietal Scalp AVM/AVF    Pre-Op Diagnosis: Right frontal-parietal scalp AVM/AVF    Post-Op Diagnosis: s/p percutaneous NBCA embolization    Surgeon: Luna Weinberg MD, PhD    Technique/Findings:   5F GlideSlender Right Radial Sheath  5F Freire-2 Glidecatheter    Percutaneous 20 gauge venocath access into posterior venous varix of the right frontal-parietal scalp AVM/AVF under ultrasound/fluorscopic guidance    Successful stage 1 percutaneous NBCA glue embolization (1:3, ~3.5 mL) of the posterior venous varix draining the posterior compartment of the right frontal-parietal scalp AVM/AVF    Residual high flow anterior compartment of the right frontal-parietal scalp AVM/AVF, supplied by the anterior/posterior superficial temporal arteries      Full report to follow      Anesthesia:  General    Complications:  None    Medications:   Ancef 2g IV  Verapamil 5mg IA  Nitroglycerin 200ug IA    Blood Loss:  < 25 mL     Assessment/Plan:  Admit NeuroICU / Neurochecks  Normotensive SBP goals 110--140 mm Hg  IVFs hydration  Percutaneous wound care    Immobilize wrist for 2 hours as per TR band deflation protocol   Assess vitals/access site/pulses and neurological status in post-procedure unit  Discharge POD 1 if no complications  F/U INR clinic 2-4 weeks and plan for stage 2 embolization        Luna Weinberg MD, PhD  1/9/2023  2:42 PM

## 2023-01-09 NOTE — ANESTHESIA PROCEDURE NOTES
Arterial Line    Date/Time: 1/9/2023 8:56 AM  Performed by: Uday Ramos MD  Authorized by: Uday Ramos MD     General Information and Staff    Procedure Start:  1/9/2023 8:56 AM  Procedure End:  1/9/2023 8:58 AM  Anesthesiologist:  Uday Ramos MD  Performed By:  Anesthesiologist  Patient Location:  OR  Indication: continuous blood pressure monitoring and blood sampling needed    Site Identification: real time ultrasound guided and surface landmarks    Preanesthetic Checklist: 2 patient identifiers, IV checked, risks and benefits discussed, monitors and equipment checked, pre-op evaluation, timeout performed, anesthesia consent and sterile technique used    Procedure Details    Catheter Size:  20 G  Catheter Length:  1 and 3/4 inch  Catheter Type:  Arrow  Seldinger Technique?: Yes    Laterality:  Left  Site:  Radial artery  Site Prep: chlorhexidine    Line Secured:  Wrist Brace, tape and Tegaderm    Assessment    Events: patient tolerated procedure well with no complications      Medications  1/9/2023 8:56 AM      Additional Comments

## 2023-01-09 NOTE — ANESTHESIA PROCEDURE NOTES
Airway  Date/Time: 1/9/2023 8:52 AM  Urgency: elective    Airway not difficult    General Information and Staff    Patient location during procedure: OR  Anesthesiologist: Franko Lester MD  Performed: anesthesiologist     Indications and Patient Condition  Indications for airway management: anesthesia  Spontaneous Ventilation: absent  Sedation level: deep  Preoxygenated: yes  Patient position: sniffing  Mask difficulty assessment: 1 - vent by mask    Final Airway Details  Final airway type: endotracheal airway      Successful airway: ETT  Cuffed: yes   Successful intubation technique: direct laryngoscopy  Facilitating devices/methods: intubating stylet  Endotracheal tube insertion site: oral  Blade: Radha  Blade size: #4  ETT size (mm): 7.5    Cormack-Lehane Classification: grade I - full view of glottis  Placement verified by: chest auscultation and capnometry   Measured from: lips  ETT to lips (cm): 23  Number of attempts at approach: 1

## 2023-01-09 NOTE — ANESTHESIA PROCEDURE NOTES
Peripheral IV  Date/Time: 1/9/2023 9:00 AM  Inserted by: Homa Deleon MD    Placement  Needle size: 18 G  Laterality: left  Location: hand  Local anesthetic: none  Site prep: alcohol  Technique: anatomical landmarks  Attempts: 1

## 2023-01-10 VITALS
TEMPERATURE: 99 F | WEIGHT: 167 LBS | RESPIRATION RATE: 19 BRPM | BODY MASS INDEX: 26.84 KG/M2 | DIASTOLIC BLOOD PRESSURE: 74 MMHG | HEIGHT: 66 IN | OXYGEN SATURATION: 97 % | SYSTOLIC BLOOD PRESSURE: 115 MMHG | HEART RATE: 79 BPM

## 2023-01-10 LAB
ANION GAP SERPL CALC-SCNC: 8 MMOL/L (ref 0–18)
BUN BLD-MCNC: 9 MG/DL (ref 7–18)
CALCIUM BLD-MCNC: 8.6 MG/DL (ref 8.5–10.1)
CHLORIDE SERPL-SCNC: 110 MMOL/L (ref 98–112)
CO2 SERPL-SCNC: 24 MMOL/L (ref 21–32)
CREAT BLD-MCNC: 0.93 MG/DL
ERYTHROCYTE [DISTWIDTH] IN BLOOD BY AUTOMATED COUNT: 13.2 %
GFR SERPLBLD BASED ON 1.73 SQ M-ARVRAT: 115 ML/MIN/1.73M2 (ref 60–?)
GLUCOSE BLD-MCNC: 103 MG/DL (ref 70–99)
HCT VFR BLD AUTO: 41.5 %
HGB BLD-MCNC: 13.9 G/DL
MCH RBC QN AUTO: 29.4 PG (ref 26–34)
MCHC RBC AUTO-ENTMCNC: 33.5 G/DL (ref 31–37)
MCV RBC AUTO: 87.9 FL
OSMOLALITY SERPL CALC.SUM OF ELEC: 293 MOSM/KG (ref 275–295)
PLATELET # BLD AUTO: 236 10(3)UL (ref 150–450)
POTASSIUM SERPL-SCNC: 3.8 MMOL/L (ref 3.5–5.1)
RBC # BLD AUTO: 4.72 X10(6)UL
SODIUM SERPL-SCNC: 142 MMOL/L (ref 136–145)
WBC # BLD AUTO: 12.7 X10(3) UL (ref 4–11)

## 2023-01-10 PROCEDURE — 99291 CRITICAL CARE FIRST HOUR: CPT | Performed by: INTERNAL MEDICINE

## 2023-01-10 PROCEDURE — 99232 SBSQ HOSP IP/OBS MODERATE 35: CPT

## 2023-01-10 RX ADMIN — SODIUM CHLORIDE: 9 INJECTION, SOLUTION INTRAVENOUS at 04:15:00

## 2023-01-10 NOTE — PLAN OF CARE
Assumed care of patient at approximately 0730. Patient is alert and oriented x4 and oxygenating on room air. Pulses are strong and palpable. Patient is able to move all extremities. [place] and godfrey were removed. Saline drip was stopped. Per NI patient is able to be discharged home. I called Leah Baum to confirm the electrolyte replacement protocol and she said that it will not need to be replaced at the moment. Will continue to monitor patient until discharged.

## 2023-01-10 NOTE — PLAN OF CARE
Assumed care @2170. Patient A&O x4. VSS. BP within goal parameters. On tele-NSR. On RA. L radial A line. Neuro checks Q1H. Site and pulse checks. Denies any pain. Best draining adequately. Best care provided. IVF. Mom at bedside. All needs met at this time.

## 2023-01-12 ENCOUNTER — PATIENT MESSAGE (OUTPATIENT)
Dept: SURGERY | Facility: CLINIC | Age: 28
End: 2023-01-12

## 2023-01-12 NOTE — TELEPHONE ENCOUNTER
Message below noted,  Pt was treated for AVM on 1/9/23 with an AVM embolization.   Pt was discharged from hospital on 1/10/23       Sent [employer] response requesting picture of \"bumps\"

## 2023-01-12 NOTE — TELEPHONE ENCOUNTER
Pictures reviewed by both Dr. Valentina Acosta and myself. I called and spoke with the patient's mom, Tyler Frazier. Advised her that it is possible that Anca Nuñez has a slight reaction to the cleaning agent used during the procedure and might be experiencing a bit of a contact dermatitis, even though he denies itching. Unruly denies fevers, bleeding, drainage from the areas, etc. Denies difficulty breathing, swallowing, and/or any s/s of an anaphylaxis reaction. Stated that they can try OTC low dose Benadryl tonight before bed, as this can make him drowsy. Also advised Tyler Frazier that it is ok for Unruly to alternate between tylenol and motrin as needed for mild discomfort. Advised her to reach back out to us if she has any further concerns/questions and/or the rash spreads.

## 2023-01-31 ENCOUNTER — OFFICE VISIT (OUTPATIENT)
Dept: SURGERY | Facility: CLINIC | Age: 28
End: 2023-01-31
Payer: MEDICAID

## 2023-01-31 ENCOUNTER — TELEPHONE (OUTPATIENT)
Dept: SURGERY | Facility: CLINIC | Age: 28
End: 2023-01-31

## 2023-01-31 VITALS
DIASTOLIC BLOOD PRESSURE: 60 MMHG | SYSTOLIC BLOOD PRESSURE: 104 MMHG | HEIGHT: 65 IN | BODY MASS INDEX: 27.66 KG/M2 | HEART RATE: 80 BPM | WEIGHT: 166 LBS

## 2023-01-31 DIAGNOSIS — Q27.30 AVM (ARTERIOVENOUS MALFORMATION): Primary | ICD-10-CM

## 2023-01-31 PROCEDURE — 3074F SYST BP LT 130 MM HG: CPT

## 2023-01-31 PROCEDURE — 99214 OFFICE O/P EST MOD 30 MIN: CPT

## 2023-01-31 PROCEDURE — 3078F DIAST BP <80 MM HG: CPT

## 2023-01-31 PROCEDURE — 3008F BODY MASS INDEX DOCD: CPT

## 2023-01-31 NOTE — TELEPHONE ENCOUNTER
Per BILLY Bills and Dr. Rebecca Landon at end of today's office visit:  Please schedule follow up AVM/AVF Embolization to be performed by Dr. Rebecca Landon in 6-8 weeks. CPT Code:  02214    Awaiting Dr. Dyan Lombardo calendar with March schedule. To be completed on or around 3/14/23.

## 2023-01-31 NOTE — PROGRESS NOTES
Patient here after treatment of AVM with Dr. Stacia Noguera on 1/9/23. Patient alternated between tylenol and motrin to manage post op pain. Review of Systems:    Hand Dominance: right  General: no symptoms reported  Neuro: no symptoms reported  Head: ringing in ears  Musculoskeletal: no symptoms reported  Cardiovascular: no symptoms reported  Gastrointestinal: no symptoms reported  Genitourinary: no symptoms reported  Respiratory: no symptoms reported  Eyes: no symptoms reported  Skin: no symptoms reported  Mouth & throat: no symptoms reported  Neck: no symptoms reported  Nose: no symptoms reported  Psychiatric: no symptoms reported     Patient is back to work, working at [employer].

## 2023-02-01 ENCOUNTER — TELEPHONE (OUTPATIENT)
Dept: SURGERY | Facility: CLINIC | Age: 28
End: 2023-02-01

## 2023-02-01 NOTE — TELEPHONE ENCOUNTER
Patient will need to be schedule for repeat diagnostic cerebral angiogram and stage II embolization of his AVM/AVF in ~6-8 weeks with Dr. Camille Rodriguez, once his schedule is available. This is to be set up the exact same way as the previous procedure. Thank you!

## 2023-02-03 NOTE — TELEPHONE ENCOUNTER
Patient is scheduled for AVM/AVF embolization and DCA on 3/13/23 with Dr. Marlon Barajas. Y  Neuro IR (IVS) order placed  Y  Pre-op lab order placed-Covid only     -- [employer] message sent with pre-op instructions    Y  Procedure placed in outlook calendar  Y  E-mail Sent to [employer] group  Routed to  for post-op appointments-Dr. Tovar's schedule not yet available to schedule. Y  [place] Medicaid Replacement routed to PA team for initiation. CPT Codes: [id number], [id number], [id number], [id number], [id number], [id number]    Nursing to address with provider:    Need for PCP clearance, repeat labs? Routed to Cosimo Krabbe, APN.

## 2023-02-06 NOTE — TELEPHONE ENCOUNTER
I had Lillian confirm with Dr. Eric Craven (anesthesiology) that no repeat PCP clearance is needed given his clearance prior and no cardiac hx with no complications during the previous procedure. I would repeat a CBC and PT/INR though, and of course, he will need a repeat COVID test.     Thank you!

## 2023-02-06 NOTE — TELEPHONE ENCOUNTER
Received feedback from BILLY Villegas and new lab orders have been placed. Informed patient's mother via [employer] message of lab order and PCP clearance appointment update.

## 2023-02-26 ENCOUNTER — LAB ENCOUNTER (OUTPATIENT)
Dept: LAB | Facility: HOSPITAL | Age: 28
End: 2023-02-26
Payer: MEDICAID

## 2023-02-26 DIAGNOSIS — Q27.30 AVM (ARTERIOVENOUS MALFORMATION): ICD-10-CM

## 2023-02-26 LAB
BASOPHILS # BLD AUTO: 0.14 X10(3) UL (ref 0–0.2)
BASOPHILS NFR BLD AUTO: 2 %
DEPRECATED RDW RBC AUTO: 41.9 FL (ref 35.1–46.3)
EOSINOPHIL # BLD AUTO: 0.62 X10(3) UL (ref 0–0.7)
EOSINOPHIL NFR BLD AUTO: 8.8 %
ERYTHROCYTE [DISTWIDTH] IN BLOOD BY AUTOMATED COUNT: 13.1 % (ref 11–15)
HCT VFR BLD AUTO: 47.1 %
HGB BLD-MCNC: 15.8 G/DL
IMM GRANULOCYTES # BLD AUTO: 0.01 X10(3) UL (ref 0–1)
IMM GRANULOCYTES NFR BLD: 0.1 %
INR BLD: 1.01 (ref 0.85–1.16)
LYMPHOCYTES # BLD AUTO: 2.29 X10(3) UL (ref 1–4)
LYMPHOCYTES NFR BLD AUTO: 32.4 %
MCH RBC QN AUTO: 29.4 PG (ref 26–34)
MCHC RBC AUTO-ENTMCNC: 33.5 G/DL (ref 31–37)
MCV RBC AUTO: 87.7 FL
MONOCYTES # BLD AUTO: 0.58 X10(3) UL (ref 0.1–1)
MONOCYTES NFR BLD AUTO: 8.2 %
NEUTROPHILS # BLD AUTO: 3.43 X10 (3) UL (ref 1.5–7.7)
NEUTROPHILS # BLD AUTO: 3.43 X10(3) UL (ref 1.5–7.7)
NEUTROPHILS NFR BLD AUTO: 48.5 %
PLATELET # BLD AUTO: 283 10(3)UL (ref 150–450)
PROTHROMBIN TIME: 13.2 SECONDS (ref 11.6–14.8)
RBC # BLD AUTO: 5.37 X10(6)UL
WBC # BLD AUTO: 7.1 X10(3) UL (ref 4–11)

## 2023-02-26 PROCEDURE — 36415 COLL VENOUS BLD VENIPUNCTURE: CPT

## 2023-02-26 PROCEDURE — 85610 PROTHROMBIN TIME: CPT

## 2023-02-26 PROCEDURE — 85025 COMPLETE CBC W/AUTO DIFF WBC: CPT

## 2023-03-07 RX ORDER — CLINDAMYCIN PHOSPHATE 10 UG/ML
LOTION TOPICAL 2 TIMES DAILY
COMMUNITY

## 2023-03-10 ENCOUNTER — LAB ENCOUNTER (OUTPATIENT)
Dept: LAB | Facility: HOSPITAL | Age: 28
End: 2023-03-10
Payer: MEDICAID

## 2023-03-10 DIAGNOSIS — Q27.30 AVM (ARTERIOVENOUS MALFORMATION): ICD-10-CM

## 2023-03-10 LAB — SARS-COV-2 RNA RESP QL NAA+PROBE: NOT DETECTED

## 2023-03-13 ENCOUNTER — HOSPITAL ENCOUNTER (OUTPATIENT)
Dept: INTERVENTIONAL RADIOLOGY/VASCULAR | Facility: HOSPITAL | Age: 28
Discharge: HOME | End: 2023-03-13
Payer: MEDICAID

## 2023-03-16 ENCOUNTER — PATIENT MESSAGE (OUTPATIENT)
Dept: SURGERY | Facility: CLINIC | Age: 28
End: 2023-03-16

## 2023-03-17 NOTE — TELEPHONE ENCOUNTER
From: Dinorah Palma  To: Silvia Ashraf  Sent: 3/16/2023 4:58 PM CDT  Subject: Pre-op Instructions and Information 4/17/23    Dear Marylee Reach (and Philly Anderson)-    Here are the updates with information about your upcoming surgery with Dr. Janette Briscoe:    Diaz Singletary are scheduled for an AVM Embolization on 4/17/23 at [facility] with [facility] and Dr. Stefanie Boone. Per Dr. Janette Briscoe, the previous labs drawn in February may be used for pre-op testing for this procedure. COVID testing is required for all pre-surgical patients regardless of vaccination status. Our providers have placed an order for a Rapid Covid test to be performed on the day of surgery. Nothing to eat or drink after midnight night before procedure. Ok to take medications with small sips of water in the morning. Stop NSAID's (Ibuprofen, Motrin, Aleve, Advil, Naproxen), Vitamin E, C, K, fish oil, Krill oil and OTC herbal supplements 10-14 days prior to procedure. If you have had an allergic reaction to contrast dye, please let us know and we will send prescriptions for prednisone and benadryl to your pharmacy to take the day of the procedure. The pre-admissions department will contact you prior to surgery to confirm your arrival time and give you day-of instructions. Arrive 1.5-2 hours prior to scheduled procedure start time and get registered at the [facility], [address] [place] [address] entrance. You will be admitted overnight following the procedure for observation. Please review the current visitors policy at [facility] by [facility] to [email or web address]. Our office will get prior authorization from your insurance carrier. You will need to follow up in the clinic with Dr. Janette Briscoe after surgery: 4/27/23 appointment at 11:00 am.     If you have any questions or concerns please contact our office at [phone] #1 or via [employer] message.      Thank you and take care-    Marilee Donaheu RN   Clinical Staff Nurse,   [facility]

## 2023-04-16 ENCOUNTER — ANESTHESIA EVENT (OUTPATIENT)
Dept: INTERVENTIONAL RADIOLOGY/VASCULAR | Facility: HOSPITAL | Age: 28
End: 2023-04-16
Payer: MEDICAID

## 2023-04-17 ENCOUNTER — HOSPITAL ENCOUNTER (INPATIENT)
Dept: INTERVENTIONAL RADIOLOGY/VASCULAR | Facility: HOSPITAL | Age: 28
LOS: 1 days | Discharge: HOME | End: 2023-04-18
Payer: MEDICAID

## 2023-04-17 ENCOUNTER — ANESTHESIA (OUTPATIENT)
Dept: INTERVENTIONAL RADIOLOGY/VASCULAR | Facility: HOSPITAL | Age: 28
End: 2023-04-17
Payer: MEDICAID

## 2023-04-17 DIAGNOSIS — Q27.30 AVM (ARTERIOVENOUS MALFORMATION): ICD-10-CM

## 2023-04-17 LAB
INR: 1 (ref 0.8–1.3)
ISTAT ACTIVATED CLOTTING TIME: 137 SECONDS (ref 74–137)

## 2023-04-17 PROCEDURE — 99291 CRITICAL CARE FIRST HOUR: CPT | Performed by: INTERNAL MEDICINE

## 2023-04-17 PROCEDURE — 03LG3DZ OCCLUSION OF INTRACRANIAL ARTERY WITH INTRALUMINAL DEVICE, PERCUTANEOUS APPROACH: ICD-10-PCS

## 2023-04-17 RX ORDER — ACETAMINOPHEN 325 MG/1
650 TABLET ORAL EVERY 4 HOURS PRN
Status: DISCONTINUED | OUTPATIENT
Start: 2023-04-17 | End: 2023-04-18

## 2023-04-17 RX ORDER — HYDROCODONE BITARTRATE AND ACETAMINOPHEN 5; 325 MG/1; MG/1
1 TABLET ORAL EVERY 4 HOURS PRN
Status: DISCONTINUED | OUTPATIENT
Start: 2023-04-17 | End: 2023-04-18

## 2023-04-17 RX ORDER — LIDOCAINE HYDROCHLORIDE 10 MG/ML
INJECTION, SOLUTION EPIDURAL; INFILTRATION; INTRACAUDAL; PERINEURAL AS NEEDED
Status: DISCONTINUED | OUTPATIENT
Start: 2023-04-17 | End: 2023-04-17 | Stop reason: SURG

## 2023-04-17 RX ORDER — CEFAZOLIN SODIUM/WATER 2 G/20 ML
SYRINGE (ML) INTRAVENOUS AS NEEDED
Status: DISCONTINUED | OUTPATIENT
Start: 2023-04-17 | End: 2023-04-17 | Stop reason: SURG

## 2023-04-17 RX ORDER — MORPHINE SULFATE 4 MG/ML
1 INJECTION, SOLUTION INTRAMUSCULAR; INTRAVENOUS EVERY 2 HOUR PRN
Status: DISCONTINUED | OUTPATIENT
Start: 2023-04-17 | End: 2023-04-18

## 2023-04-17 RX ORDER — ONDANSETRON 2 MG/ML
4 INJECTION INTRAMUSCULAR; INTRAVENOUS EVERY 6 HOURS PRN
Status: DISCONTINUED | OUTPATIENT
Start: 2023-04-17 | End: 2023-04-18

## 2023-04-17 RX ORDER — IBUPROFEN 200 MG
600 TABLET ORAL ONCE AS NEEDED
Status: DISCONTINUED | OUTPATIENT
Start: 2023-04-17 | End: 2023-04-17 | Stop reason: HOSPADM

## 2023-04-17 RX ORDER — HYDROCODONE BITARTRATE AND ACETAMINOPHEN 10; 325 MG/1; MG/1
2 TABLET ORAL ONCE AS NEEDED
Status: DISCONTINUED | OUTPATIENT
Start: 2023-04-17 | End: 2023-04-17 | Stop reason: HOSPADM

## 2023-04-17 RX ORDER — ALCOHOL 0.98 ML/ML
5 INJECTION INTRASPINAL ONCE
Status: DISCONTINUED | OUTPATIENT
Start: 2023-04-17 | End: 2023-04-18 | Stop reason: ALTCHOICE

## 2023-04-17 RX ORDER — HEPARIN SODIUM 1000 [USP'U]/ML
INJECTION, SOLUTION INTRAVENOUS; SUBCUTANEOUS
Status: COMPLETED
Start: 2023-04-17 | End: 2023-04-17

## 2023-04-17 RX ORDER — ONDANSETRON 2 MG/ML
4 INJECTION INTRAMUSCULAR; INTRAVENOUS EVERY 6 HOURS PRN
Status: DISCONTINUED | OUTPATIENT
Start: 2023-04-17 | End: 2023-04-17 | Stop reason: HOSPADM

## 2023-04-17 RX ORDER — HYDROMORPHONE HYDROCHLORIDE 1 MG/ML
0.2 INJECTION, SOLUTION INTRAMUSCULAR; INTRAVENOUS; SUBCUTANEOUS EVERY 5 MIN PRN
Status: DISCONTINUED | OUTPATIENT
Start: 2023-04-17 | End: 2023-04-17 | Stop reason: HOSPADM

## 2023-04-17 RX ORDER — LIDOCAINE AND PRILOCAINE 25; 25 MG/G; MG/G
CREAM TOPICAL
Status: COMPLETED
Start: 2023-04-17 | End: 2023-04-17

## 2023-04-17 RX ORDER — HYDROCODONE BITARTRATE AND ACETAMINOPHEN 10; 325 MG/1; MG/1
1 TABLET ORAL ONCE AS NEEDED
Status: DISCONTINUED | OUTPATIENT
Start: 2023-04-17 | End: 2023-04-17 | Stop reason: HOSPADM

## 2023-04-17 RX ORDER — NALOXONE HYDROCHLORIDE 0.4 MG/ML
80 INJECTION, SOLUTION INTRAMUSCULAR; INTRAVENOUS; SUBCUTANEOUS AS NEEDED
Status: DISCONTINUED | OUTPATIENT
Start: 2023-04-17 | End: 2023-04-17 | Stop reason: HOSPADM

## 2023-04-17 RX ORDER — ONDANSETRON 2 MG/ML
INJECTION INTRAMUSCULAR; INTRAVENOUS AS NEEDED
Status: DISCONTINUED | OUTPATIENT
Start: 2023-04-17 | End: 2023-04-17 | Stop reason: SURG

## 2023-04-17 RX ORDER — CEFAZOLIN SODIUM/WATER 2 G/20 ML
SYRINGE (ML) INTRAVENOUS
Status: COMPLETED
Start: 2023-04-17 | End: 2023-04-17

## 2023-04-17 RX ORDER — LABETALOL HYDROCHLORIDE 5 MG/ML
10 INJECTION, SOLUTION INTRAVENOUS EVERY 10 MIN PRN
Status: DISCONTINUED | OUTPATIENT
Start: 2023-04-17 | End: 2023-04-18

## 2023-04-17 RX ORDER — SODIUM CHLORIDE 9 MG/ML
INJECTION, SOLUTION INTRAVENOUS CONTINUOUS PRN
Status: DISCONTINUED | OUTPATIENT
Start: 2023-04-17 | End: 2023-04-17 | Stop reason: SURG

## 2023-04-17 RX ORDER — LIDOCAINE HYDROCHLORIDE 10 MG/ML
INJECTION, SOLUTION INFILTRATION; PERINEURAL
Status: COMPLETED
Start: 2023-04-17 | End: 2023-04-17

## 2023-04-17 RX ORDER — MIDAZOLAM HYDROCHLORIDE 1 MG/ML
1 INJECTION INTRAMUSCULAR; INTRAVENOUS EVERY 5 MIN PRN
Status: DISCONTINUED | OUTPATIENT
Start: 2023-04-17 | End: 2023-04-17 | Stop reason: HOSPADM

## 2023-04-17 RX ORDER — HYDRALAZINE HYDROCHLORIDE 20 MG/ML
10 INJECTION INTRAMUSCULAR; INTRAVENOUS EVERY 4 HOURS PRN
Status: DISCONTINUED | OUTPATIENT
Start: 2023-04-17 | End: 2023-04-18

## 2023-04-17 RX ORDER — SODIUM CHLORIDE, SODIUM LACTATE, POTASSIUM CHLORIDE, CALCIUM CHLORIDE 600; 310; 30; 20 MG/100ML; MG/100ML; MG/100ML; MG/100ML
INJECTION, SOLUTION INTRAVENOUS CONTINUOUS
Status: DISCONTINUED | OUTPATIENT
Start: 2023-04-17 | End: 2023-04-18

## 2023-04-17 RX ORDER — ROCURONIUM BROMIDE 10 MG/ML
INJECTION, SOLUTION INTRAVENOUS AS NEEDED
Status: DISCONTINUED | OUTPATIENT
Start: 2023-04-17 | End: 2023-04-17 | Stop reason: SURG

## 2023-04-17 RX ORDER — ACETAMINOPHEN 500 MG
1000 TABLET ORAL ONCE AS NEEDED
Status: DISCONTINUED | OUTPATIENT
Start: 2023-04-17 | End: 2023-04-17 | Stop reason: HOSPADM

## 2023-04-17 RX ORDER — HEPARIN SODIUM 5000 [USP'U]/ML
INJECTION, SOLUTION INTRAVENOUS; SUBCUTANEOUS
Status: COMPLETED
Start: 2023-04-17 | End: 2023-04-17

## 2023-04-17 RX ORDER — HYDROCODONE BITARTRATE AND ACETAMINOPHEN 10; 325 MG/1; MG/1
1 TABLET ORAL EVERY 4 HOURS PRN
Status: DISCONTINUED | OUTPATIENT
Start: 2023-04-17 | End: 2023-04-18

## 2023-04-17 RX ORDER — IODIXANOL 320 MG/ML
150 INJECTION, SOLUTION INTRAVASCULAR
Status: COMPLETED | OUTPATIENT
Start: 2023-04-17 | End: 2023-04-17

## 2023-04-17 RX ORDER — DEXAMETHASONE SODIUM PHOSPHATE 4 MG/ML
VIAL (ML) INJECTION AS NEEDED
Status: DISCONTINUED | OUTPATIENT
Start: 2023-04-17 | End: 2023-04-17 | Stop reason: SURG

## 2023-04-17 RX ORDER — MIDAZOLAM HYDROCHLORIDE 1 MG/ML
INJECTION INTRAMUSCULAR; INTRAVENOUS AS NEEDED
Status: DISCONTINUED | OUTPATIENT
Start: 2023-04-17 | End: 2023-04-17 | Stop reason: SURG

## 2023-04-17 RX ORDER — HYDROMORPHONE HYDROCHLORIDE 1 MG/ML
0.4 INJECTION, SOLUTION INTRAMUSCULAR; INTRAVENOUS; SUBCUTANEOUS EVERY 5 MIN PRN
Status: DISCONTINUED | OUTPATIENT
Start: 2023-04-17 | End: 2023-04-17 | Stop reason: HOSPADM

## 2023-04-17 RX ORDER — HYDROMORPHONE HYDROCHLORIDE 1 MG/ML
0.6 INJECTION, SOLUTION INTRAMUSCULAR; INTRAVENOUS; SUBCUTANEOUS EVERY 5 MIN PRN
Status: DISCONTINUED | OUTPATIENT
Start: 2023-04-17 | End: 2023-04-17 | Stop reason: HOSPADM

## 2023-04-17 RX ORDER — ACETAMINOPHEN 650 MG/1
650 SUPPOSITORY RECTAL EVERY 4 HOURS PRN
Status: DISCONTINUED | OUTPATIENT
Start: 2023-04-17 | End: 2023-04-18

## 2023-04-17 RX ORDER — MORPHINE SULFATE 4 MG/ML
2 INJECTION, SOLUTION INTRAMUSCULAR; INTRAVENOUS EVERY 2 HOUR PRN
Status: DISCONTINUED | OUTPATIENT
Start: 2023-04-17 | End: 2023-04-18

## 2023-04-17 RX ORDER — PROCHLORPERAZINE EDISYLATE 5 MG/ML
5 INJECTION INTRAMUSCULAR; INTRAVENOUS EVERY 8 HOURS PRN
Status: DISCONTINUED | OUTPATIENT
Start: 2023-04-17 | End: 2023-04-17 | Stop reason: HOSPADM

## 2023-04-17 RX ORDER — PROCHLORPERAZINE EDISYLATE 5 MG/ML
5 INJECTION INTRAMUSCULAR; INTRAVENOUS EVERY 8 HOURS PRN
Status: DISCONTINUED | OUTPATIENT
Start: 2023-04-17 | End: 2023-04-18

## 2023-04-17 RX ADMIN — MIDAZOLAM HYDROCHLORIDE 2 MG: 1 INJECTION INTRAMUSCULAR; INTRAVENOUS at 09:50:00

## 2023-04-17 RX ADMIN — IODIXANOL 100 ML: 320 INJECTION, SOLUTION INTRAVASCULAR at 14:18:00

## 2023-04-17 RX ADMIN — LIDOCAINE HYDROCHLORIDE 25 MG: 10 INJECTION, SOLUTION EPIDURAL; INFILTRATION; INTRACAUDAL; PERINEURAL at 09:54:00

## 2023-04-17 RX ADMIN — CEFAZOLIN SODIUM/WATER 2 G: 2 G/20 ML SYRINGE (ML) INTRAVENOUS at 09:48:00

## 2023-04-17 RX ADMIN — ROCURONIUM BROMIDE 20 MG: 10 INJECTION, SOLUTION INTRAVENOUS at 10:32:00

## 2023-04-17 RX ADMIN — DEXAMETHASONE SODIUM PHOSPHATE 4 MG: 4 MG/ML VIAL (ML) INJECTION at 10:14:00

## 2023-04-17 RX ADMIN — SODIUM CHLORIDE: 9 INJECTION, SOLUTION INTRAVENOUS at 10:15:00

## 2023-04-17 RX ADMIN — CEFAZOLIN SODIUM/WATER 2 G: 2 G/20 ML SYRINGE (ML) INTRAVENOUS at 13:48:00

## 2023-04-17 RX ADMIN — ROCURONIUM BROMIDE 10 MG: 10 INJECTION, SOLUTION INTRAVENOUS at 09:55:00

## 2023-04-17 RX ADMIN — ONDANSETRON 4 MG: 2 INJECTION INTRAMUSCULAR; INTRAVENOUS at 10:14:00

## 2023-04-17 RX ADMIN — DEXAMETHASONE SODIUM PHOSPHATE 6 MG: 4 MG/ML VIAL (ML) INJECTION at 13:54:00

## 2023-04-17 RX ADMIN — MORPHINE SULFATE 2 MG: 4 INJECTION, SOLUTION INTRAMUSCULAR; INTRAVENOUS at 14:49:00

## 2023-04-17 RX ADMIN — SODIUM CHLORIDE: 9 INJECTION, SOLUTION INTRAVENOUS at 14:29:00

## 2023-04-17 RX ADMIN — SODIUM CHLORIDE, SODIUM LACTATE, POTASSIUM CHLORIDE, CALCIUM CHLORIDE: 600; 310; 30; 20 INJECTION, SOLUTION INTRAVENOUS at 14:33:00

## 2023-04-17 RX ADMIN — HYDROCODONE BITARTRATE AND ACETAMINOPHEN 1 TABLET: 10; 325 TABLET ORAL at 16:49:00

## 2023-04-17 RX ADMIN — ROCURONIUM BROMIDE 30 MG: 10 INJECTION, SOLUTION INTRAVENOUS at 09:58:00

## 2023-04-17 NOTE — ANESTHESIA POSTPROCEDURE EVALUATION
Emir 28 Patient Status:  Outpatient   Age/Gender 29year old male MRN [id number]   Location [address] [facility] Attending Victor Hugo Perez MD, PhD   Roberta Johansen # 0 PCP Quynh Tabor MD       Anesthesia Post-op Note        Procedure Summary     Date: 04/17/23 Room / Location: [address] [place] [address]    Anesthesia Start: 7145 Anesthesia Stop:     Procedure: IVS NEURO Diagnosis:       AVM (arteriovenous malformation)      AVM (arteriovenous malformation)    Scheduled Providers: Monie Martin MD Anesthesiologist: Monie Martin MD    Anesthesia Type: general ASA Status: 3          Anesthesia Type: general    Vitals Value Taken Time   /54 04/17/23 1429   Temp 97.2 04/17/23 1429   Pulse 89 04/17/23 1429   Resp 17 04/17/23 1429   SpO2 99 04/17/23 1429       Patient Location: ICU    Anesthesia Type: general    Airway Patency: patent    Postop Pain Control: adequate    Mental Status: mildly sedated but able to meaningfully participate in the post-anesthesia evaluation    Nausea/Vomiting: none    Cardiopulmonary/Hydration status: stable euvolemic    Complications: no apparent anesthesia related complications    Postop vital signs: stable    Dental Exam: Unchanged from Preop    Sign out given to ICU staff.

## 2023-04-17 NOTE — PLAN OF CARE
Received pt from neuro IR lab ~1430. Neuro checks intact. No c/o N/V. C/O H/Ad pain, given PRN meds. Passed bedside swallow, diet advanced. bekah Sykes. Lying flat until 1830. Parents @ bedside, updated on POC. No other issues at this time, will continue to monitor.

## 2023-04-17 NOTE — ANESTHESIA PROCEDURE NOTES
Arterial Line    Performed by: Gloria Lynn MD  Authorized by: Gloria Lynn MD    General Information and Staff    Procedure Start:   Anesthesiologist:  Gloria Lynn MD  Performed By:  Anesthesiologist  Patient Location:  OR  Indication: continuous blood pressure monitoring and blood sampling needed    Site Identification: surface landmarks    Preanesthetic Checklist: 2 patient identifiers, IV checked, risks and benefits discussed, monitors and equipment checked, pre-op evaluation, timeout performed, anesthesia consent and sterile technique used    Procedure Details    Catheter Size:  20 G  Catheter Length:  1 and 3/4 inch  Catheter Type:  Arrow  Seldinger Technique?: Yes    Laterality:  Left  Site:  Radial artery  Site Prep: chlorhexidine    Line Secured:  Wrist Brace, tape and Tegaderm    Assessment    Events: patient tolerated procedure well with no complications      Medications      Additional Comments

## 2023-04-17 NOTE — ANESTHESIA PROCEDURE NOTES
Airway  Date/Time: 4/17/2023 9:57 AM  Urgency: elective      General Information and Staff    Patient location during procedure: OR  Anesthesiologist: Rose Grullon MD  Performed: anesthesiologist   Performed by: Rose Grullon MD  Authorized by: Rose Grullon MD      Indications and Patient Condition  Indications for airway management: anesthesia  Sedation level: deep  Preoxygenated: yes  Patient position: sniffing  Mask difficulty assessment: 1 - vent by mask    Final Airway Details  Final airway type: endotracheal airway      Successful airway: ETT  Cuffed: yes   Successful intubation technique: direct laryngoscopy  Endotracheal tube insertion site: oral  Blade: Radha  Blade size: #4  ETT size (mm): 7.0    Placement verified by: chest auscultation and capnometry   Measured from: lips  Number of attempts at approach: 1

## 2023-04-18 VITALS
RESPIRATION RATE: 18 BRPM | SYSTOLIC BLOOD PRESSURE: 118 MMHG | DIASTOLIC BLOOD PRESSURE: 78 MMHG | OXYGEN SATURATION: 99 % | TEMPERATURE: 99 F | WEIGHT: 168.44 LBS | HEART RATE: 72 BPM | BODY MASS INDEX: 28 KG/M2

## 2023-04-18 LAB
ANION GAP SERPL CALC-SCNC: 3 MMOL/L (ref 0–18)
BUN BLD-MCNC: 9 MG/DL (ref 7–18)
CALCIUM BLD-MCNC: 8.6 MG/DL (ref 8.5–10.1)
CHLORIDE SERPL-SCNC: 110 MMOL/L (ref 98–112)
CO2 SERPL-SCNC: 25 MMOL/L (ref 21–32)
CREAT BLD-MCNC: 0.78 MG/DL
ERYTHROCYTE [DISTWIDTH] IN BLOOD BY AUTOMATED COUNT: 12.8 %
GFR SERPLBLD BASED ON 1.73 SQ M-ARVRAT: 125 ML/MIN/1.73M2 (ref 60–?)
GLUCOSE BLD-MCNC: 132 MG/DL (ref 70–99)
HCT VFR BLD AUTO: 42.9 %
HGB BLD-MCNC: 14.8 G/DL
MCH RBC QN AUTO: 29.6 PG (ref 26–34)
MCHC RBC AUTO-ENTMCNC: 34.5 G/DL (ref 31–37)
MCV RBC AUTO: 85.8 FL
OSMOLALITY SERPL CALC.SUM OF ELEC: 287 MOSM/KG (ref 275–295)
PLATELET # BLD AUTO: 248 10(3)UL (ref 150–450)
POTASSIUM SERPL-SCNC: 3.9 MMOL/L (ref 3.5–5.1)
RBC # BLD AUTO: 5 X10(6)UL
SODIUM SERPL-SCNC: 138 MMOL/L (ref 136–145)
WBC # BLD AUTO: 12.8 X10(3) UL (ref 4–11)

## 2023-04-18 PROCEDURE — 99291 CRITICAL CARE FIRST HOUR: CPT | Performed by: INTERNAL MEDICINE

## 2023-04-18 PROCEDURE — 99232 SBSQ HOSP IP/OBS MODERATE 35: CPT

## 2023-04-18 RX ADMIN — LABETALOL HYDROCHLORIDE 10 MG: 5 INJECTION, SOLUTION INTRAVENOUS at 00:03:00

## 2023-04-18 RX ADMIN — ACETAMINOPHEN 650 MG: 325 TABLET ORAL at 10:00:00

## 2023-04-18 NOTE — PLAN OF CARE
Pt alert and oriented x4 q1 kirill assessment intact. SBP parameter maintained with PRN antihypertensives. Groin soft intact no hematoma. Significant urine output. Tolerating PO intake no N/V. A line and godfrey removed.

## 2023-04-20 ENCOUNTER — PATIENT MESSAGE (OUTPATIENT)
Dept: SURGERY | Facility: CLINIC | Age: 28
End: 2023-04-20

## 2023-04-20 NOTE — TELEPHONE ENCOUNTER
Spoke to Irlanda Garcia. Who instructed pt should take some benadryl as this helped last time.      mychart response sent

## 2023-04-21 RX ORDER — METHYLPREDNISOLONE 4 MG/1
TABLET ORAL
Qty: 1 EACH | Refills: 0 | Status: SHIPPED | OUTPATIENT
Start: 2023-04-21

## 2023-04-21 NOTE — TELEPHONE ENCOUNTER
Called and spoke with patient's mom, Freddy Roque. She denies any visual disturbances (double vision/blurred vision), denies redness of effected eye. Denies pain. Reports \"he is able to open it slightly, but it's still pretty swollen after the benadryl. \"    She reports that his scalp AVM has continued to decrease in size since the procedure. Will order Medrol dose pack and advise to continue to utilize warm compresses. Advised to seek medical attention should he experience any pain, changes in vision, etc    Mom stated understanding and appreciation for the call.

## 2023-04-24 ENCOUNTER — TELEPHONE (OUTPATIENT)
Dept: SURGERY | Facility: CLINIC | Age: 28
End: 2023-04-24

## 2023-04-27 ENCOUNTER — OFFICE VISIT (OUTPATIENT)
Dept: SURGERY | Facility: CLINIC | Age: 28
End: 2023-04-27
Payer: MEDICAID

## 2023-04-27 VITALS
HEART RATE: 72 BPM | DIASTOLIC BLOOD PRESSURE: 70 MMHG | WEIGHT: 166 LBS | HEIGHT: 65 IN | SYSTOLIC BLOOD PRESSURE: 114 MMHG | BODY MASS INDEX: 27.66 KG/M2

## 2023-04-27 DIAGNOSIS — I77.0 AVF (ARTERIOVENOUS FISTULA) (HCC): Primary | ICD-10-CM

## 2023-04-27 PROCEDURE — 3008F BODY MASS INDEX DOCD: CPT

## 2023-04-27 PROCEDURE — 3078F DIAST BP <80 MM HG: CPT

## 2023-04-27 PROCEDURE — 3074F SYST BP LT 130 MM HG: CPT

## 2023-04-27 PROCEDURE — 99214 OFFICE O/P EST MOD 30 MIN: CPT

## 2023-04-27 RX ORDER — SERTRALINE HYDROCHLORIDE 25 MG/1
TABLET, FILM COATED ORAL
COMMUNITY
Start: 2023-03-21

## 2023-04-27 RX ORDER — CLINDAMYCIN AND BENZOYL PEROXIDE 10; 50 MG/G; MG/G
1 GEL TOPICAL AS DIRECTED
COMMUNITY

## 2023-04-27 NOTE — PROGRESS NOTES
[facility] rooming For established patients  Patient here for Post AVM Embolization from 4/17/2023  Changes since LOV: swelling around eye and cheek has resolved   Anticoagulants: None     Review of Systems:    Hand Dominance: right  General: no symptoms reported  Neuro: no symptoms reported  Head: no symptoms reported  Musculoskeletal: no symptoms reported  Cardiovascular: no symptoms reported  Gastrointestinal: no symptoms reported  Genitourinary: no symptoms reported  Respiratory: no symptoms reported  Eyes: no symptoms reported  Skin: no symptoms reported  Mouth & throat: no symptoms reported  Neck: no symptoms reported  Nose: no symptoms reported  Psychiatric: no symptoms reported    Barthel  MRS 2

## 2023-04-28 ENCOUNTER — TELEPHONE (OUTPATIENT)
Dept: SURGERY | Facility: CLINIC | Age: 28
End: 2023-04-28

## 2023-05-23 ENCOUNTER — HOSPITAL ENCOUNTER (EMERGENCY)
Facility: HOSPITAL | Age: 28
Discharge: HOME | End: 2023-05-23
Attending: EMERGENCY MEDICINE
Payer: MEDICAID

## 2023-05-23 VITALS
DIASTOLIC BLOOD PRESSURE: 68 MMHG | HEART RATE: 70 BPM | BODY MASS INDEX: 26.66 KG/M2 | TEMPERATURE: 98 F | OXYGEN SATURATION: 96 % | WEIGHT: 160 LBS | SYSTOLIC BLOOD PRESSURE: 118 MMHG | HEIGHT: 65 IN | RESPIRATION RATE: 20 BRPM

## 2023-05-23 DIAGNOSIS — S01.85XA DOG BITE OF FACE, INITIAL ENCOUNTER: ICD-10-CM
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
## 2023-05-23 DIAGNOSIS — S01.511A LIP LACERATION, INITIAL ENCOUNTER: Primary | ICD-10-CM

## 2023-05-23 PROCEDURE — 99283 EMERGENCY DEPT VISIT LOW MDM: CPT

## 2023-05-23 PROCEDURE — 99284 EMERGENCY DEPT VISIT MOD MDM: CPT

## 2023-05-23 PROCEDURE — 12011 RPR F/E/E/N/L/M 2.5 CM/<: CPT

## 2023-05-23 RX ORDER — ACETAMINOPHEN 500 MG
1000 TABLET ORAL ONCE
Status: COMPLETED | OUTPATIENT
Start: 2023-05-23 | End: 2023-05-23

## 2023-05-23 RX ORDER — AMOXICILLIN AND CLAVULANATE POTASSIUM 875; 125 MG/1; MG/1
1 TABLET, FILM COATED ORAL 2 TIMES DAILY
Qty: 10 TABLET | Refills: 0 | Status: SHIPPED | OUTPATIENT
Start: 2023-05-23 | End: 2023-05-28

## 2023-05-23 NOTE — ED INITIAL ASSESSMENT (HPI)
Patient to ED with mother s/p dog bite to left lip from his own dog who is up to date on vaccinations. Bleeding is controlled at this time, he states it was bleeding for about an hour.

## 2023-05-24 ENCOUNTER — TELEPHONE (OUTPATIENT)
Dept: SURGERY | Facility: CLINIC | Age: 28
End: 2023-05-24

## 2023-05-24 DIAGNOSIS — I77.0 AVF (ARTERIOVENOUS FISTULA) (HCC): Primary | ICD-10-CM

## 2023-05-26 RX ORDER — PREDNISONE 10 MG/1
TABLET ORAL
Qty: 15 TABLET | Refills: 0 | OUTPATIENT
Start: 2023-06-25

## 2023-05-26 RX ORDER — DIPHENHYDRAMINE HCL 50 MG
50 CAPSULE ORAL AS DIRECTED
Qty: 1 CAPSULE | Refills: 0 | OUTPATIENT
Start: 2023-06-26

## 2023-05-26 NOTE — TELEPHONE ENCOUNTER
Patient is scheduled to undergo AVF/AVM embolization on 6/26/23 with Dr. Conrado Simmons. Y  Neuro IR (IVS) order placed  Y  Pre-op lab order placed:  Labs ordered-- CBC, CMP, PT-INR, PTT, Covid. Orders for prednisone and benadryl initiated and pended, routed to BILLY Lopez.      CPT codes:  [id number], [id number], [id number], [id number], [id number], [id number], [id number], [id number], 36556

## 2023-06-16 ENCOUNTER — LAB ENCOUNTER (OUTPATIENT)
Dept: LAB | Facility: HOSPITAL | Age: 28
End: 2023-06-16
Payer: MEDICAID

## 2023-06-16 DIAGNOSIS — I77.0 AVF (ARTERIOVENOUS FISTULA) (HCC): ICD-10-CM

## 2023-06-16 LAB
ALBUMIN SERPL-MCNC: 4.1 G/DL (ref 3.4–5)
ALBUMIN/GLOB SERPL: 1 {RATIO} (ref 1–2)
ALP LIVER SERPL-CCNC: 79 U/L
ALT SERPL-CCNC: 35 U/L
ANION GAP SERPL CALC-SCNC: 5 MMOL/L (ref 0–18)
APTT PPP: 26.4 SECONDS (ref 23.3–35.6)
AST SERPL-CCNC: 19 U/L (ref 15–37)
BASOPHILS # BLD AUTO: 0.09 X10(3) UL (ref 0–0.2)
BASOPHILS NFR BLD AUTO: 1.1 %
BILIRUB SERPL-MCNC: 0.2 MG/DL (ref 0.1–2)
BUN BLD-MCNC: 15 MG/DL (ref 7–18)
BUN/CREAT SERPL: 15 (ref 10–20)
CALCIUM BLD-MCNC: 9.9 MG/DL (ref 8.5–10.1)
CHLORIDE SERPL-SCNC: 108 MMOL/L (ref 98–112)
CO2 SERPL-SCNC: 28 MMOL/L (ref 21–32)
CREAT BLD-MCNC: 1 MG/DL
DEPRECATED RDW RBC AUTO: 40.1 FL (ref 35.1–46.3)
EOSINOPHIL # BLD AUTO: 0.15 X10(3) UL (ref 0–0.7)
EOSINOPHIL NFR BLD AUTO: 1.8 %
ERYTHROCYTE [DISTWIDTH] IN BLOOD BY AUTOMATED COUNT: 13 % (ref 11–15)
FASTING STATUS PATIENT QL REPORTED: NO
GFR SERPLBLD BASED ON 1.73 SQ M-ARVRAT: 105 ML/MIN/1.73M2 (ref 60–?)
GLOBULIN PLAS-MCNC: 4 G/DL (ref 2.8–4.4)
GLUCOSE BLD-MCNC: 85 MG/DL (ref 70–99)
HCT VFR BLD AUTO: 45.2 %
HGB BLD-MCNC: 15.4 G/DL
IMM GRANULOCYTES # BLD AUTO: 0.02 X10(3) UL (ref 0–1)
IMM GRANULOCYTES NFR BLD: 0.2 %
INR BLD: 0.99 (ref 0.85–1.16)
LYMPHOCYTES # BLD AUTO: 2.1 X10(3) UL (ref 1–4)
LYMPHOCYTES NFR BLD AUTO: 25.3 %
MCH RBC QN AUTO: 29.3 PG (ref 26–34)
MCHC RBC AUTO-ENTMCNC: 34.1 G/DL (ref 31–37)
MCV RBC AUTO: 85.9 FL
MONOCYTES # BLD AUTO: 0.58 X10(3) UL (ref 0.1–1)
MONOCYTES NFR BLD AUTO: 7 %
NEUTROPHILS # BLD AUTO: 5.37 X10 (3) UL (ref 1.5–7.7)
NEUTROPHILS # BLD AUTO: 5.37 X10(3) UL (ref 1.5–7.7)
NEUTROPHILS NFR BLD AUTO: 64.6 %
OSMOLALITY SERPL CALC.SUM OF ELEC: 292 MOSM/KG (ref 275–295)
PLATELET # BLD AUTO: 271 10(3)UL (ref 150–450)
POTASSIUM SERPL-SCNC: 4 MMOL/L (ref 3.5–5.1)
PROT SERPL-MCNC: 8.1 G/DL (ref 6.4–8.2)
PROTHROMBIN TIME: 13 SECONDS (ref 11.6–14.8)
RBC # BLD AUTO: 5.26 X10(6)UL
SODIUM SERPL-SCNC: 141 MMOL/L (ref 136–145)
WBC # BLD AUTO: 8.3 X10(3) UL (ref 4–11)

## 2023-06-16 PROCEDURE — 85730 THROMBOPLASTIN TIME PARTIAL: CPT

## 2023-06-16 PROCEDURE — 36415 COLL VENOUS BLD VENIPUNCTURE: CPT

## 2023-06-16 PROCEDURE — 85610 PROTHROMBIN TIME: CPT

## 2023-06-16 PROCEDURE — 85025 COMPLETE CBC W/AUTO DIFF WBC: CPT

## 2023-06-16 PROCEDURE — 80053 COMPREHEN METABOLIC PANEL: CPT

## 2023-06-19 ENCOUNTER — PATIENT MESSAGE (OUTPATIENT)
Dept: SURGERY | Facility: CLINIC | Age: 28
End: 2023-06-19

## 2023-06-19 NOTE — TELEPHONE ENCOUNTER
From: Kenna Sawyer  To: Leigh Fabian MD, PhD  Sent: 6/19/2023 9:03 AM CDT  Subject: Covid Test    Tiffani - is Unruly still required to have a Covid test prior to his procedure on Monday, 6/26? Please advise.      Thank you,  Diana Mares (mom)

## 2023-06-19 NOTE — TELEPHONE ENCOUNTER
Received message from patient's mother inquiring if patient will be needing a Covid test to be performed prior to 6/26/23 surgery with Dr. Anuja Krishnamurthy. Replied to mother Elias Sunshine her that patient should have Covid test completed 48-72 hours prior to surgery. Contact number for Central Scheduling sent in message as well.

## 2023-06-19 NOTE — TELEPHONE ENCOUNTER
Patient's mother acknowledged Covid test information. Reminded Ambar Minor that patient's post op appointment has yet to be arranged.

## 2023-06-23 ENCOUNTER — LAB ENCOUNTER (OUTPATIENT)
Dept: LAB | Facility: HOSPITAL | Age: 28
End: 2023-06-23
Payer: MEDICAID

## 2023-06-23 DIAGNOSIS — I77.0 AVF (ARTERIOVENOUS FISTULA) (HCC): ICD-10-CM

## 2023-06-23 PROCEDURE — 87635 SARS-COV-2 COVID-19 AMP PRB: CPT

## 2023-06-24 LAB — SARS-COV-2 RNA RESP QL NAA+PROBE: NOT DETECTED

## 2023-06-25 ENCOUNTER — ANESTHESIA EVENT (OUTPATIENT)
Dept: INTERVENTIONAL RADIOLOGY/VASCULAR | Facility: HOSPITAL | Age: 28
End: 2023-06-25
Payer: MEDICAID

## 2023-06-26 ENCOUNTER — HOSPITAL ENCOUNTER (INPATIENT)
Dept: INTERVENTIONAL RADIOLOGY/VASCULAR | Facility: HOSPITAL | Age: 28
LOS: 1 days | Discharge: HOME | End: 2023-06-27
Payer: MEDICAID

## 2023-06-26 ENCOUNTER — ANESTHESIA (OUTPATIENT)
Dept: INTERVENTIONAL RADIOLOGY/VASCULAR | Facility: HOSPITAL | Age: 28
End: 2023-06-26
Payer: MEDICAID

## 2023-06-26 DIAGNOSIS — I77.0 AVF (ARTERIOVENOUS FISTULA) (HCC): ICD-10-CM

## 2023-06-26 LAB
GLUCOSE BLD-MCNC: 129 MG/DL (ref 70–99)
ISTAT ACTIVATED CLOTTING TIME: 113 SECONDS (ref 74–137)
ISTAT ACTIVATED CLOTTING TIME: 167 SECONDS (ref 74–137)
ISTAT ACTIVATED CLOTTING TIME: 179 SECONDS (ref 74–137)
ISTAT ACTIVATED CLOTTING TIME: 185 SECONDS (ref 74–137)
ISTAT ACTIVATED CLOTTING TIME: 185 SECONDS (ref 74–137)
ISTAT ACTIVATED CLOTTING TIME: 191 SECONDS (ref 74–137)
ISTAT ACTIVATED CLOTTING TIME: 191 SECONDS (ref 74–137)

## 2023-06-26 PROCEDURE — B31RYZZ FLUOROSCOPY OF INTRACRANIAL ARTERIES USING OTHER CONTRAST: ICD-10-PCS

## 2023-06-26 PROCEDURE — B31CYZZ FLUOROSCOPY OF BILATERAL EXTERNAL CAROTID ARTERIES USING OTHER CONTRAST: ICD-10-PCS

## 2023-06-26 PROCEDURE — 03LG3DZ OCCLUSION OF INTRACRANIAL ARTERY WITH INTRALUMINAL DEVICE, PERCUTANEOUS APPROACH: ICD-10-PCS

## 2023-06-26 PROCEDURE — S0028 INJECTION, FAMOTIDINE, 20 MG: HCPCS | Performed by: ANESTHESIOLOGY

## 2023-06-26 PROCEDURE — B318YZZ FLUOROSCOPY OF BILATERAL INTERNAL CAROTID ARTERIES USING OTHER CONTRAST: ICD-10-PCS

## 2023-06-26 RX ORDER — ROCURONIUM BROMIDE 10 MG/ML
INJECTION, SOLUTION INTRAVENOUS AS NEEDED
Status: DISCONTINUED | OUTPATIENT
Start: 2023-06-26 | End: 2023-06-26 | Stop reason: SURG

## 2023-06-26 RX ORDER — MORPHINE SULFATE 2 MG/ML
2 INJECTION, SOLUTION INTRAMUSCULAR; INTRAVENOUS EVERY 2 HOUR PRN
Status: DISCONTINUED | OUTPATIENT
Start: 2023-06-26 | End: 2023-06-27

## 2023-06-26 RX ORDER — ALCOHOL 0.98 ML/ML
10 INJECTION INTRASPINAL ONCE
Status: DISCONTINUED | OUTPATIENT
Start: 2023-06-26 | End: 2023-06-27

## 2023-06-26 RX ORDER — MIDAZOLAM HYDROCHLORIDE 1 MG/ML
INJECTION INTRAMUSCULAR; INTRAVENOUS AS NEEDED
Status: DISCONTINUED | OUTPATIENT
Start: 2023-06-26 | End: 2023-06-26 | Stop reason: SURG

## 2023-06-26 RX ORDER — VERAPAMIL HYDROCHLORIDE 2.5 MG/ML
INJECTION, SOLUTION INTRAVENOUS
Status: COMPLETED
Start: 2023-06-26 | End: 2023-06-26

## 2023-06-26 RX ORDER — LIDOCAINE HYDROCHLORIDE 10 MG/ML
INJECTION, SOLUTION INFILTRATION; PERINEURAL
Status: COMPLETED
Start: 2023-06-26 | End: 2023-06-26

## 2023-06-26 RX ORDER — HEPARIN SODIUM 5000 [USP'U]/ML
INJECTION, SOLUTION INTRAVENOUS; SUBCUTANEOUS
Status: COMPLETED
Start: 2023-06-26 | End: 2023-06-26

## 2023-06-26 RX ORDER — MIDAZOLAM HYDROCHLORIDE 1 MG/ML
1 INJECTION INTRAMUSCULAR; INTRAVENOUS EVERY 5 MIN PRN
Status: ACTIVE | OUTPATIENT
Start: 2023-06-26 | End: 2023-06-27

## 2023-06-26 RX ORDER — DEXAMETHASONE SODIUM PHOSPHATE 4 MG/ML
VIAL (ML) INJECTION AS NEEDED
Status: DISCONTINUED | OUTPATIENT
Start: 2023-06-26 | End: 2023-06-26 | Stop reason: SURG

## 2023-06-26 RX ORDER — DIPHENHYDRAMINE HYDROCHLORIDE 50 MG/ML
INJECTION INTRAMUSCULAR; INTRAVENOUS AS NEEDED
Status: DISCONTINUED | OUTPATIENT
Start: 2023-06-26 | End: 2023-06-26 | Stop reason: SURG

## 2023-06-26 RX ORDER — CEFAZOLIN SODIUM/WATER 2 G/20 ML
SYRINGE (ML) INTRAVENOUS
Status: COMPLETED
Start: 2023-06-26 | End: 2023-06-26

## 2023-06-26 RX ORDER — ACETAMINOPHEN 325 MG/1
650 TABLET ORAL EVERY 4 HOURS PRN
Status: DISCONTINUED | OUTPATIENT
Start: 2023-06-26 | End: 2023-06-27

## 2023-06-26 RX ORDER — PROCHLORPERAZINE EDISYLATE 5 MG/ML
5 INJECTION INTRAMUSCULAR; INTRAVENOUS EVERY 8 HOURS PRN
Status: DISCONTINUED | OUTPATIENT
Start: 2023-06-26 | End: 2023-06-27

## 2023-06-26 RX ORDER — DEXAMETHASONE SODIUM PHOSPHATE 4 MG/ML
4 VIAL (ML) INJECTION EVERY 6 HOURS
Status: DISCONTINUED | OUTPATIENT
Start: 2023-06-26 | End: 2023-06-27

## 2023-06-26 RX ORDER — MORPHINE SULFATE 2 MG/ML
2 INJECTION, SOLUTION INTRAMUSCULAR; INTRAVENOUS EVERY 10 MIN PRN
Status: ACTIVE | OUTPATIENT
Start: 2023-06-26 | End: 2023-06-26

## 2023-06-26 RX ORDER — NITROGLYCERIN 20 MG/100ML
INJECTION INTRAVENOUS
Status: COMPLETED
Start: 2023-06-26 | End: 2023-06-26

## 2023-06-26 RX ORDER — IODIXANOL 320 MG/ML
250 INJECTION, SOLUTION INTRAVASCULAR
Status: COMPLETED | OUTPATIENT
Start: 2023-06-26 | End: 2023-06-26

## 2023-06-26 RX ORDER — LABETALOL HYDROCHLORIDE 5 MG/ML
10 INJECTION, SOLUTION INTRAVENOUS EVERY 10 MIN PRN
Status: DISCONTINUED | OUTPATIENT
Start: 2023-06-26 | End: 2023-06-27

## 2023-06-26 RX ORDER — CEFAZOLIN SODIUM/WATER 2 G/20 ML
SYRINGE (ML) INTRAVENOUS AS NEEDED
Status: DISCONTINUED | OUTPATIENT
Start: 2023-06-26 | End: 2023-06-26 | Stop reason: SURG

## 2023-06-26 RX ORDER — HEPARIN SODIUM 1000 [USP'U]/ML
INJECTION, SOLUTION INTRAVENOUS; SUBCUTANEOUS
Status: COMPLETED
Start: 2023-06-26 | End: 2023-06-26

## 2023-06-26 RX ORDER — FAMOTIDINE 10 MG/ML
INJECTION, SOLUTION INTRAVENOUS AS NEEDED
Status: DISCONTINUED | OUTPATIENT
Start: 2023-06-26 | End: 2023-06-26 | Stop reason: SURG

## 2023-06-26 RX ORDER — ONDANSETRON 2 MG/ML
4 INJECTION INTRAMUSCULAR; INTRAVENOUS ONCE AS NEEDED
Status: ACTIVE | OUTPATIENT
Start: 2023-06-26 | End: 2023-06-27

## 2023-06-26 RX ORDER — SODIUM CHLORIDE 9 MG/ML
INJECTION, SOLUTION INTRAVENOUS CONTINUOUS
Status: DISCONTINUED | OUTPATIENT
Start: 2023-06-26 | End: 2023-06-27

## 2023-06-26 RX ORDER — MORPHINE SULFATE 2 MG/ML
1 INJECTION, SOLUTION INTRAMUSCULAR; INTRAVENOUS EVERY 2 HOUR PRN
Status: DISCONTINUED | OUTPATIENT
Start: 2023-06-26 | End: 2023-06-27

## 2023-06-26 RX ORDER — LIDOCAINE AND PRILOCAINE 25; 25 MG/G; MG/G
CREAM TOPICAL
Status: COMPLETED
Start: 2023-06-26 | End: 2023-06-26

## 2023-06-26 RX ORDER — NEOSTIGMINE METHYLSULFATE 1 MG/ML
INJECTION, SOLUTION INTRAVENOUS AS NEEDED
Status: DISCONTINUED | OUTPATIENT
Start: 2023-06-26 | End: 2023-06-26 | Stop reason: SURG

## 2023-06-26 RX ORDER — ONDANSETRON 2 MG/ML
4 INJECTION INTRAMUSCULAR; INTRAVENOUS EVERY 6 HOURS PRN
Status: DISCONTINUED | OUTPATIENT
Start: 2023-06-26 | End: 2023-06-27

## 2023-06-26 RX ORDER — GLYCOPYRROLATE 0.2 MG/ML
INJECTION, SOLUTION INTRAMUSCULAR; INTRAVENOUS AS NEEDED
Status: DISCONTINUED | OUTPATIENT
Start: 2023-06-26 | End: 2023-06-26 | Stop reason: SURG

## 2023-06-26 RX ORDER — DIPHENHYDRAMINE HYDROCHLORIDE 50 MG/ML
12.5 INJECTION INTRAMUSCULAR; INTRAVENOUS AS NEEDED
Status: ACTIVE | OUTPATIENT
Start: 2023-06-26 | End: 2023-06-27

## 2023-06-26 RX ORDER — ESMOLOL HYDROCHLORIDE 10 MG/ML
INJECTION INTRAVENOUS AS NEEDED
Status: DISCONTINUED | OUTPATIENT
Start: 2023-06-26 | End: 2023-06-26 | Stop reason: SURG

## 2023-06-26 RX ORDER — PHENYLEPHRINE HCL 10 MG/ML
VIAL (ML) INJECTION AS NEEDED
Status: DISCONTINUED | OUTPATIENT
Start: 2023-06-26 | End: 2023-06-26 | Stop reason: SURG

## 2023-06-26 RX ORDER — SODIUM CHLORIDE, SODIUM LACTATE, POTASSIUM CHLORIDE, CALCIUM CHLORIDE 600; 310; 30; 20 MG/100ML; MG/100ML; MG/100ML; MG/100ML
INJECTION, SOLUTION INTRAVENOUS CONTINUOUS
Status: DISCONTINUED | OUTPATIENT
Start: 2023-06-26 | End: 2023-06-26

## 2023-06-26 RX ORDER — LIDOCAINE HYDROCHLORIDE 10 MG/ML
INJECTION, SOLUTION EPIDURAL; INFILTRATION; INTRACAUDAL; PERINEURAL AS NEEDED
Status: DISCONTINUED | OUTPATIENT
Start: 2023-06-26 | End: 2023-06-26 | Stop reason: SURG

## 2023-06-26 RX ORDER — SODIUM CHLORIDE 9 MG/ML
INJECTION, SOLUTION INTRAVENOUS CONTINUOUS PRN
Status: DISCONTINUED | OUTPATIENT
Start: 2023-06-26 | End: 2023-06-26 | Stop reason: SURG

## 2023-06-26 RX ORDER — HYDROMORPHONE HYDROCHLORIDE 1 MG/ML
0.4 INJECTION, SOLUTION INTRAMUSCULAR; INTRAVENOUS; SUBCUTANEOUS EVERY 5 MIN PRN
Status: DISPENSED | OUTPATIENT
Start: 2023-06-26 | End: 2023-06-26

## 2023-06-26 RX ORDER — NALOXONE HYDROCHLORIDE 0.4 MG/ML
80 INJECTION, SOLUTION INTRAMUSCULAR; INTRAVENOUS; SUBCUTANEOUS AS NEEDED
Status: ACTIVE | OUTPATIENT
Start: 2023-06-26 | End: 2023-06-27

## 2023-06-26 RX ORDER — ACETAMINOPHEN 650 MG/1
650 SUPPOSITORY RECTAL EVERY 4 HOURS PRN
Status: DISCONTINUED | OUTPATIENT
Start: 2023-06-26 | End: 2023-06-27

## 2023-06-26 RX ORDER — DEXAMETHASONE SODIUM PHOSPHATE 4 MG/ML
4 VIAL (ML) INJECTION ONCE AS NEEDED
Status: ACTIVE | OUTPATIENT
Start: 2023-06-26 | End: 2023-06-27

## 2023-06-26 RX ADMIN — SODIUM CHLORIDE: 9 INJECTION, SOLUTION INTRAVENOUS at 08:28:00

## 2023-06-26 RX ADMIN — ESMOLOL HYDROCHLORIDE 30 MG: 10 INJECTION INTRAVENOUS at 12:36:00

## 2023-06-26 RX ADMIN — PROCHLORPERAZINE EDISYLATE 5 MG: 5 INJECTION INTRAMUSCULAR; INTRAVENOUS at 23:18:00

## 2023-06-26 RX ADMIN — PHENYLEPHRINE HCL 100 MCG: 10 MG/ML VIAL (ML) INJECTION at 09:12:00

## 2023-06-26 RX ADMIN — FAMOTIDINE 20 MG: 10 INJECTION, SOLUTION INTRAVENOUS at 08:36:00

## 2023-06-26 RX ADMIN — NEOSTIGMINE METHYLSULFATE 4 MG: 1 INJECTION, SOLUTION INTRAVENOUS at 16:58:00

## 2023-06-26 RX ADMIN — DIPHENHYDRAMINE HYDROCHLORIDE 50 MG: 50 INJECTION INTRAMUSCULAR; INTRAVENOUS at 08:31:00

## 2023-06-26 RX ADMIN — CEFAZOLIN SODIUM/WATER 2 G: 2 G/20 ML SYRINGE (ML) INTRAVENOUS at 08:36:00

## 2023-06-26 RX ADMIN — ESMOLOL HYDROCHLORIDE 30 MG: 10 INJECTION INTRAVENOUS at 13:18:00

## 2023-06-26 RX ADMIN — SODIUM CHLORIDE: 9 INJECTION, SOLUTION INTRAVENOUS at 18:30:00

## 2023-06-26 RX ADMIN — DEXAMETHASONE SODIUM PHOSPHATE 4 MG: 4 MG/ML VIAL (ML) INJECTION at 19:51:00

## 2023-06-26 RX ADMIN — DEXAMETHASONE SODIUM PHOSPHATE 8 MG: 4 MG/ML VIAL (ML) INJECTION at 14:48:00

## 2023-06-26 RX ADMIN — SODIUM CHLORIDE, SODIUM LACTATE, POTASSIUM CHLORIDE, CALCIUM CHLORIDE: 600; 310; 30; 20 INJECTION, SOLUTION INTRAVENOUS at 17:36:00

## 2023-06-26 RX ADMIN — CEFAZOLIN SODIUM/WATER 2 G: 2 G/20 ML SYRINGE (ML) INTRAVENOUS at 12:26:00

## 2023-06-26 RX ADMIN — CEFAZOLIN SODIUM/WATER 2 G: 2 G/20 ML SYRINGE (ML) INTRAVENOUS at 16:26:00

## 2023-06-26 RX ADMIN — HYDROMORPHONE HYDROCHLORIDE 0.4 MG: 1 INJECTION, SOLUTION INTRAMUSCULAR; INTRAVENOUS; SUBCUTANEOUS at 17:52:00

## 2023-06-26 RX ADMIN — PHENYLEPHRINE HCL 100 MCG: 10 MG/ML VIAL (ML) INJECTION at 09:24:00

## 2023-06-26 RX ADMIN — MIDAZOLAM HYDROCHLORIDE 2 MG: 1 INJECTION INTRAMUSCULAR; INTRAVENOUS at 08:29:00

## 2023-06-26 RX ADMIN — SODIUM CHLORIDE: 9 INJECTION, SOLUTION INTRAVENOUS at 15:27:00

## 2023-06-26 RX ADMIN — LIDOCAINE HYDROCHLORIDE 50 MG: 10 INJECTION, SOLUTION EPIDURAL; INFILTRATION; INTRACAUDAL; PERINEURAL at 08:37:00

## 2023-06-26 RX ADMIN — ROCURONIUM BROMIDE 10 MG: 10 INJECTION, SOLUTION INTRAVENOUS at 10:49:00

## 2023-06-26 RX ADMIN — SODIUM CHLORIDE: 9 INJECTION, SOLUTION INTRAVENOUS at 09:02:00

## 2023-06-26 RX ADMIN — ROCURONIUM BROMIDE 20 MG: 10 INJECTION, SOLUTION INTRAVENOUS at 13:31:00

## 2023-06-26 RX ADMIN — ONDANSETRON 4 MG: 2 INJECTION INTRAMUSCULAR; INTRAVENOUS at 20:35:00

## 2023-06-26 RX ADMIN — SODIUM CHLORIDE: 9 INJECTION, SOLUTION INTRAVENOUS at 09:01:00

## 2023-06-26 RX ADMIN — IODIXANOL 175 ML: 320 INJECTION, SOLUTION INTRAVASCULAR at 17:54:00

## 2023-06-26 RX ADMIN — ROCURONIUM BROMIDE 20 MG: 10 INJECTION, SOLUTION INTRAVENOUS at 09:15:00

## 2023-06-26 RX ADMIN — ROCURONIUM BROMIDE 20 MG: 10 INJECTION, SOLUTION INTRAVENOUS at 09:44:00

## 2023-06-26 RX ADMIN — SODIUM CHLORIDE: 9 INJECTION, SOLUTION INTRAVENOUS at 09:50:00

## 2023-06-26 RX ADMIN — DEXAMETHASONE SODIUM PHOSPHATE 4 MG: 4 MG/ML VIAL (ML) INJECTION at 16:27:00

## 2023-06-26 RX ADMIN — HYDROMORPHONE HYDROCHLORIDE 0.4 MG: 1 INJECTION, SOLUTION INTRAMUSCULAR; INTRAVENOUS; SUBCUTANEOUS at 17:30:00

## 2023-06-26 RX ADMIN — ROCURONIUM BROMIDE 50 MG: 10 INJECTION, SOLUTION INTRAVENOUS at 08:39:00

## 2023-06-26 RX ADMIN — ROCURONIUM BROMIDE 10 MG: 10 INJECTION, SOLUTION INTRAVENOUS at 16:29:00

## 2023-06-26 RX ADMIN — SODIUM CHLORIDE: 9 INJECTION, SOLUTION INTRAVENOUS at 17:20:00

## 2023-06-26 RX ADMIN — ESMOLOL HYDROCHLORIDE 30 MG: 10 INJECTION INTRAVENOUS at 09:40:00

## 2023-06-26 RX ADMIN — GLYCOPYRROLATE 0.8 MG: 0.2 INJECTION, SOLUTION INTRAMUSCULAR; INTRAVENOUS at 16:58:00

## 2023-06-26 NOTE — PROCEDURES
[facility]     Interventional Neuroradiology Procedure Note      Procedure: Stage 3 Trans-Arterial Ethanol Embolization of Right Frontal-Parietal Scalp AVM/AVF    Pre-Op Diagnosis:  right frontal-parietal scalp AVM/AVF     Post-Op Diagnosis: s/p endovascular embolization    Surgeon: Andres Haney MD, PhD    Technique/Findings:    5F GlideSlender Right Radial Sheath  5F Freire-2 Glidecatheter    5F Envoy Guide catheter  [facility] 70   Thurlow Lunger . 010, [place] . 008    Interval recurrence/recruitment of diffuse right frontal-parietal scalp AVM with no residual large outflow venous varix for percutaneous embolization targeting    Successful IA ethanol embolization of distal posterior branch STA (20 mL) and distal transosseous occipital artery (15mL) feeders resulting in significant flow stasis and superficial AVM nidal obliteration     Residual right frontal-parietal scalp AVM supply via the anterior STA, deep temporal artery, and frontal MMA     Full report to follow      Anesthesia:  General    Complications:  None    Medications:   Ancef 2g IV  Heparin 4000 units IA  Verapamil 5mg IA  Nitroglycerin 200ug IA  Decadron 4mg q6h    Blood Loss:  < 25 mL     Assessment/Plan:  Admit NeuroICU / Neurochecks  IVFs  Post-embolization pain/edema control, NSAIDS/dilaudid  Dexamethasone 4 mg q6   Assess scalp for skin necrosis / tissue injury complications at 2-02 days s/p IA ethanol treatment  Consult plastic surgery    Immobilize wrist for 2 hours as per TR band deflation protocol   Assess vitals/access site/pulses and neurological status in post-procedure unit    Steroid taper and antibiotics at discharge    D/w Butch Beltrán MD, PhD  6/26/2023  6:18 PM

## 2023-06-26 NOTE — PLAN OF CARE
Neuros and vitals q1h. Pt recovered post procedure. Pt 5/5 on all extremities. Pt alert and oriented x4. Pt has a Left radial art line and SBP<160. Pt complains of pain. PRN pain medication given . Problem: PAIN - ADULT  Goal: Verbalizes/displays adequate comfort level or patient's stated pain goal  Description: INTERVENTIONS:  - Encourage pt to monitor pain and request assistance  - Assess pain using appropriate pain scale  - Administer analgesics based on type and severity of pain and evaluate response  - Implement non-pharmacological measures as appropriate and evaluate response  - Consider cultural and social influences on pain and pain management  - Manage/alleviate anxiety  - Utilize distraction and/or relaxation techniques  - Monitor for opioid side effects  - Notify MD/LIP if interventions unsuccessful or patient reports new pain  - Anticipate increased pain with activity and pre-medicate as appropriate  Outcome: Progressing     Problem: NEUROLOGICAL - ADULT  Goal: Achieves stable or improved neurological status  Description: INTERVENTIONS  - Assess for and report changes in neurological status  - Initiate measures to prevent increased intracranial pressure  - Maintain blood pressure and fluid volume within ordered parameters to optimize cerebral perfusion and minimize risk of hemorrhage  - Monitor temperature, glucose, and sodium. Initiate appropriate interventions as ordered  Outcome: Progressing     Problem: SAFETY ADULT - FALL  Goal: Free from fall injury  Description: INTERVENTIONS:  - Assess pt frequently for physical needs  - Identify cognitive and physical deficits and behaviors that affect risk of falls.   - [place] fall precautions as indicated by assessment.  - Educate pt/family on patient safety including physical limitations  - Instruct pt to call for assistance with activity based on assessment  - Modify environment to reduce risk of injury  - Provide assistive devices as appropriate  - Consider OT/PT consult to assist with strengthening/mobility  - Encourage toileting schedule  Outcome: Progressing

## 2023-06-26 NOTE — ANESTHESIA PROCEDURE NOTES
Airway  Date/Time: 6/26/2023 8:42 AM  Urgency: elective      General Information and Staff    Patient location during procedure: OR  Anesthesiologist: Renee River MD  Performed: anesthesiologist   Performed by: Renee River MD  Authorized by: Renee River MD      Indications and Patient Condition  Indications for airway management: anesthesia  Sedation level: deep  Preoxygenated: yes  Patient position: sniffing  Mask difficulty assessment: 2 - vent by mask + OA or adjuvant +/- NMBA    Final Airway Details  Final airway type: endotracheal airway      Successful airway: ETT  Cuffed: yes   Successful intubation technique: direct laryngoscopy  Endotracheal tube insertion site: oral  Blade: Radha  Blade size: #3  ETT size (mm): 8.0    Cormack-Lehane Classification: grade I - full view of glottis  Placement verified by: capnometry   Measured from: lips  ETT to lips (cm): 21  Number of attempts at approach: 1    Additional Comments   OETT placed without airway or dental trauma.

## 2023-06-26 NOTE — ANESTHESIA PROCEDURE NOTES
Peripheral IV  Date/Time: 6/26/2023 8:58 AM  Inserted by:  Anette Delaney MD    Placement  Needle size: 18 G  Laterality: left  Location: hand  Local anesthetic: none  Site prep: alcohol  Technique: anatomical landmarks  Attempts: 1

## 2023-06-26 NOTE — H&P
History & Physical Examination    Patient Name: Travis Oscar  MRN: [id number]  CSN: [id number]  YOB: 1995    Diagnosis: Right parietal-temporal scalp AVM     Present Illness: Antoni Church is a 28 y/o male who underwent successful stage 2 percutaneous NBCA glue embolization of a large, posterior venous varix of his right parietal-temporal scalp AVM on 4/17/23 by Dr. Wilfred Dow. He is here today for a repeat [address] and possible stage III embolization. + intermittent pulsatile tinnitus- significantly improved since previous    Has been NPO since midnight 6/26  Denies recent illness such as fevers or GI symptoms. Labs from 6/16 reviewed: BMP, CBC, PT/INR all WNL. No current facility-administered medications for this encounter. Allergies:   Radiology Contrast *    SWELLING    Past Medical History:   Diagnosis Date    ADHD     Anxiety     Autism     Autism spectrum disorder      History reviewed. No pertinent surgical history. Family History   Problem Relation Age of Onset    Stroke Maternal Grandmother     Colon Cancer Maternal Grandfather     Diabetes Maternal Grandfather     Cancer Paternal Grandfather         pancreatic     Social History    Tobacco Use      Smoking status: Never      Smokeless tobacco: Never    Alcohol use: Never      Neurological Exam:  Mental status: Oriented to person, place, and time   Speech: Fluent, no dysarthria  CN: II-XII grossly intact  Memory and comprehension: Intact   Motor: No drift, no focal arm or leg weakness. Sensory: Intact to light touch    [ x ] I have reviewed the History and Physical done within the last 30 days. Any changes noted above.     BAR Shultz  6/26/2023, 7:43 AM  Spectre [id number]

## 2023-06-26 NOTE — ANESTHESIA PROCEDURE NOTES
Arterial Line    Date/Time: 6/26/2023 8:43 AM    Performed by: Jody Mast MD  Authorized by: Jody Mast MD    General Information and Staff    Procedure Start:  6/26/2023 8:43 AM  Procedure End:  6/26/2023 8:47 AM  Anesthesiologist:  Jody Mast MD  Performed By:  Anesthesiologist  Patient location: neurointervention suite. Site Identification: surface landmarks    Preanesthetic Checklist: 2 patient identifiers, IV checked, risks and benefits discussed, monitors and equipment checked, pre-op evaluation, timeout performed, anesthesia consent and sterile technique used    Procedure Details    Catheter Size:  20 G  Catheter Length:  1 and 3/4 inch  Catheter Type:  Arrow  Seldinger Technique?: Yes    Laterality:  Left  Site:  Radial artery  Site Prep: chlorhexidine    Line Secured:  Tegaderm    Assessment    Events: patient tolerated procedure well with no complications      Medications  6/26/2023 8:43 AM      Additional Comments    Uneventful arterial line placement after induction.

## 2023-06-27 VITALS
SYSTOLIC BLOOD PRESSURE: 99 MMHG | WEIGHT: 167.13 LBS | HEIGHT: 65 IN | DIASTOLIC BLOOD PRESSURE: 61 MMHG | RESPIRATION RATE: 19 BRPM | HEART RATE: 64 BPM | BODY MASS INDEX: 27.85 KG/M2 | OXYGEN SATURATION: 97 % | TEMPERATURE: 99 F

## 2023-06-27 PROBLEM — I77.0 AVF (ARTERIOVENOUS FISTULA): Status: ACTIVE | Noted: 2023-06-27

## 2023-06-27 PROBLEM — I77.0 AVF (ARTERIOVENOUS FISTULA) (HCC): Status: ACTIVE | Noted: 2023-06-27

## 2023-06-27 LAB
ALBUMIN SERPL-MCNC: 3.2 G/DL (ref 3.4–5)
ALBUMIN/GLOB SERPL: 0.9 {RATIO} (ref 1–2)
ALP LIVER SERPL-CCNC: 72 U/L
ALT SERPL-CCNC: 27 U/L
ANION GAP SERPL CALC-SCNC: 7 MMOL/L (ref 0–18)
APTT PPP: 22.5 SECONDS (ref 23.3–35.6)
AST SERPL-CCNC: 37 U/L (ref 15–37)
BILIRUB SERPL-MCNC: 0.4 MG/DL (ref 0.1–2)
BUN BLD-MCNC: 8 MG/DL (ref 7–18)
CALCIUM BLD-MCNC: 8.6 MG/DL (ref 8.5–10.1)
CHLORIDE SERPL-SCNC: 111 MMOL/L (ref 98–112)
CO2 SERPL-SCNC: 23 MMOL/L (ref 21–32)
CREAT BLD-MCNC: 0.76 MG/DL
ERYTHROCYTE [DISTWIDTH] IN BLOOD BY AUTOMATED COUNT: 13.2 %
GFR SERPLBLD BASED ON 1.73 SQ M-ARVRAT: 126 ML/MIN/1.73M2 (ref 60–?)
GLOBULIN PLAS-MCNC: 3.4 G/DL (ref 2.8–4.4)
GLUCOSE BLD-MCNC: 121 MG/DL (ref 70–99)
GLUCOSE BLD-MCNC: 125 MG/DL (ref 70–99)
HCT VFR BLD AUTO: 40.2 %
HGB BLD-MCNC: 13.6 G/DL
INR BLD: 1.13 (ref 0.85–1.16)
MCH RBC QN AUTO: 29.6 PG (ref 26–34)
MCHC RBC AUTO-ENTMCNC: 33.8 G/DL (ref 31–37)
MCV RBC AUTO: 87.6 FL
OSMOLALITY SERPL CALC.SUM OF ELEC: 292 MOSM/KG (ref 275–295)
PLATELET # BLD AUTO: 209 10(3)UL (ref 150–450)
POTASSIUM SERPL-SCNC: 4 MMOL/L (ref 3.5–5.1)
PROT SERPL-MCNC: 6.6 G/DL (ref 6.4–8.2)
PROTHROMBIN TIME: 14.5 SECONDS (ref 11.6–14.8)
RBC # BLD AUTO: 4.59 X10(6)UL
SODIUM SERPL-SCNC: 141 MMOL/L (ref 136–145)
WBC # BLD AUTO: 12.9 X10(3) UL (ref 4–11)

## 2023-06-27 PROCEDURE — 99291 CRITICAL CARE FIRST HOUR: CPT | Performed by: INTERNAL MEDICINE

## 2023-06-27 RX ORDER — BISACODYL 10 MG
10 SUPPOSITORY, RECTAL RECTAL
Status: DISCONTINUED | OUTPATIENT
Start: 2023-06-27 | End: 2023-06-27

## 2023-06-27 RX ORDER — METHYLPREDNISOLONE 4 MG/1
TABLET ORAL
Qty: 1 EACH | Refills: 0 | Status: SHIPPED | OUTPATIENT
Start: 2023-06-27

## 2023-06-27 RX ORDER — POLYETHYLENE GLYCOL 3350 17 G/17G
17 POWDER, FOR SOLUTION ORAL DAILY PRN
Status: DISCONTINUED | OUTPATIENT
Start: 2023-06-27 | End: 2023-06-27

## 2023-06-27 RX ORDER — ENEMA 19; 7 G/133ML; G/133ML
1 ENEMA RECTAL ONCE AS NEEDED
Status: DISCONTINUED | OUTPATIENT
Start: 2023-06-27 | End: 2023-06-27

## 2023-06-27 RX ORDER — SENNOSIDES 8.6 MG
17.2 TABLET ORAL NIGHTLY PRN
Status: DISCONTINUED | OUTPATIENT
Start: 2023-06-27 | End: 2023-06-27

## 2023-06-27 RX ADMIN — DEXAMETHASONE SODIUM PHOSPHATE 4 MG: 4 MG/ML VIAL (ML) INJECTION at 13:36:00

## 2023-06-27 RX ADMIN — DEXAMETHASONE SODIUM PHOSPHATE 4 MG: 4 MG/ML VIAL (ML) INJECTION at 02:00:00

## 2023-06-27 RX ADMIN — DEXAMETHASONE SODIUM PHOSPHATE 4 MG: 4 MG/ML VIAL (ML) INJECTION at 08:07:00

## 2023-06-27 NOTE — PLAN OF CARE
Assumed care of patient at approximately 1930 resting in bed. Pt c/o mild headache but declines pain medication. Pt had 2 episodes of vomiting after eating and drinking, prn zofran and compazine given. He was also instructed to just take small sips of fluids until nausea subsided. Neuro exams q1 hr, intact. Right scalp mildly swollen and dark pink in color. Right radial access site intact with no hematoma, pulse palpable. Mother at bedside. Pt and mother updated on plan of care. See flow sheets for further details. Problem: PAIN - ADULT  Goal: Verbalizes/displays adequate comfort level or patient's stated pain goal  Description: INTERVENTIONS:  - Encourage pt to monitor pain and request assistance  - Assess pain using appropriate pain scale  - Administer analgesics based on type and severity of pain and evaluate response  - Implement non-pharmacological measures as appropriate and evaluate response  - Consider cultural and social influences on pain and pain management  - Manage/alleviate anxiety  - Utilize distraction and/or relaxation techniques  - Monitor for opioid side effects  - Notify MD/LIP if interventions unsuccessful or patient reports new pain  - Anticipate increased pain with activity and pre-medicate as appropriate  Outcome: Progressing     Problem: NEUROLOGICAL - ADULT  Goal: Achieves stable or improved neurological status  Description: INTERVENTIONS  - Assess for and report changes in neurological status  - Initiate measures to prevent increased intracranial pressure  - Maintain blood pressure and fluid volume within ordered parameters to optimize cerebral perfusion and minimize risk of hemorrhage  - Monitor temperature, glucose, and sodium.  Initiate appropriate interventions as ordered  Outcome: Progressing     Problem: SAFETY ADULT - FALL  Goal: Free from fall injury  Description: INTERVENTIONS:  - Assess pt frequently for physical needs  - Identify cognitive and physical deficits and behaviors that affect risk of falls.   - [place] fall precautions as indicated by assessment.  - Educate pt/family on patient safety including physical limitations  - Instruct pt to call for assistance with activity based on assessment  - Modify environment to reduce risk of injury  - Provide assistive devices as appropriate  - Consider OT/PT consult to assist with strengthening/mobility  - Encourage toileting schedule  Outcome: Progressing

## 2023-06-27 NOTE — PLAN OF CARE
Patient discharged home with family. PIV x2 removed. Tele removed. AVS reviewed with patient parents at bedside. Patient taken to car with wheelchair. Problem: Patient/Family Goals  Goal: Patient/Family Long Term Goal  Description: Patient's Long Term Goal: Disease management    Interventions:  - MD follow up  -Medication management  - See additional Care Plan goals for specific interventions  Outcome: Adequate for Discharge  Goal: Patient/Family Short Term Goal  Description: Patient's Short Term Goal: Hospital discharge    Interventions:   - Medication management  - participate in PT/OT/ST as ordered  - See additional Care Plan goals for specific interventions  Outcome: Adequate for Discharge     Problem: PAIN - ADULT  Goal: Verbalizes/displays adequate comfort level or patient's stated pain goal  Description: INTERVENTIONS:  - Encourage pt to monitor pain and request assistance  - Assess pain using appropriate pain scale  - Administer analgesics based on type and severity of pain and evaluate response  - Implement non-pharmacological measures as appropriate and evaluate response  - Consider cultural and social influences on pain and pain management  - Manage/alleviate anxiety  - Utilize distraction and/or relaxation techniques  - Monitor for opioid side effects  - Notify MD/LIP if interventions unsuccessful or patient reports new pain  - Anticipate increased pain with activity and pre-medicate as appropriate  Outcome: Adequate for Discharge     Problem: NEUROLOGICAL - ADULT  Goal: Achieves stable or improved neurological status  Description: INTERVENTIONS  - Assess for and report changes in neurological status  - Initiate measures to prevent increased intracranial pressure  - Maintain blood pressure and fluid volume within ordered parameters to optimize cerebral perfusion and minimize risk of hemorrhage  - Monitor temperature, glucose, and sodium.  Initiate appropriate interventions as ordered  Outcome: Adequate for Discharge     Problem: SAFETY ADULT - FALL  Goal: Free from fall injury  Description: INTERVENTIONS:  - Assess pt frequently for physical needs  - Identify cognitive and physical deficits and behaviors that affect risk of falls.   - [place] fall precautions as indicated by assessment.  - Educate pt/family on patient safety including physical limitations  - Instruct pt to call for assistance with activity based on assessment  - Modify environment to reduce risk of injury  - Provide assistive devices as appropriate  - Consider OT/PT consult to assist with strengthening/mobility  - Encourage toileting schedule  Outcome: Adequate for Discharge

## 2023-06-28 ENCOUNTER — PATIENT MESSAGE (OUTPATIENT)
Dept: SURGERY | Facility: CLINIC | Age: 28
End: 2023-06-28

## 2023-06-28 NOTE — PAYOR COMM NOTE
Discharge Notification    Patient Name: Wava Gaucher: [facility] #: [id number]  Authorization Number: [id number]  Admit Date/Time: 6/26/2023 7:24 AM  Discharge Date/Time: 6/27/2023 5:15 PM

## 2023-07-20 ENCOUNTER — OFFICE VISIT (OUTPATIENT)
Dept: SURGERY | Facility: CLINIC | Age: 28
End: 2023-07-20
Payer: MEDICAID

## 2023-07-20 VITALS
HEART RATE: 96 BPM | DIASTOLIC BLOOD PRESSURE: 62 MMHG | SYSTOLIC BLOOD PRESSURE: 122 MMHG | HEIGHT: 65 IN | WEIGHT: 167 LBS | BODY MASS INDEX: 27.82 KG/M2

## 2023-07-20 DIAGNOSIS — Q27.30 AVM (ARTERIOVENOUS MALFORMATION): Primary | ICD-10-CM

## 2023-07-20 PROCEDURE — 3074F SYST BP LT 130 MM HG: CPT

## 2023-07-20 PROCEDURE — 3008F BODY MASS INDEX DOCD: CPT

## 2023-07-20 PROCEDURE — 99214 OFFICE O/P EST MOD 30 MIN: CPT

## 2023-07-20 PROCEDURE — 3078F DIAST BP <80 MM HG: CPT

## 2023-07-20 NOTE — PROGRESS NOTES
DAI rooming For established patients  Patient here for AVM/AVF Embolization  Last office visit on 4.27.23  New imaging since last seen: N/A  Last procedure: 6.26.23  Changes since LOV: N/A  Anticoagulants: N/A  Review of Systems:    Hand Dominance: right  General: no symptoms reported  Neuro: no symptoms reported  Head: no symptoms reported  Musculoskeletal: no symptoms reported  Cardiovascular: no symptoms reported  Gastrointestinal: no symptoms reported  Genitourinary: no symptoms reported  Respiratory: no symptoms reported  Eyes: no symptoms reported  Skin: no symptoms reported  Mouth & throat: no symptoms reported  Neck: no symptoms reported  Nose: no symptoms reported  Psychiatric: no symptoms reported  [place]: 100  MRS: 0

## 2023-07-21 ENCOUNTER — TELEPHONE (OUTPATIENT)
Dept: SURGERY | Facility: CLINIC | Age: 28
End: 2023-07-21

## 2023-07-21 NOTE — TELEPHONE ENCOUNTER
Please schedule patient for stage IV AVM/AVF embolization with Trans-arterial Ethanol under general anesthesia by Dr. Angelina Pike in late Sept 2023, pending pt/family's availability post vacation. Thank you!

## 2023-08-07 ENCOUNTER — TELEPHONE (OUTPATIENT)
Dept: SURGERY | Facility: CLINIC | Age: 28
End: 2023-08-07

## 2023-08-07 NOTE — TELEPHONE ENCOUNTER
Received patient reminder from 2019 in Nursing pool: \"Per providers BAR Swenson and Dr. Angelina Pike on 10/04/19     Per Dr. Angelina Pike / [facility] neurovascular conference recommendations      \"will follow patient with serial imaging. Repeat noncontrast MRI/MRA brain at 1 (repeat August / September 2020), 2 (then 2021) , and then at the 4 (2023) year follow up\"      If there is any change we will discuss treatment, but follow for now with conservative management\"    Noted that patient underwent AVM treatment with Dr. Angelina Pike on 6/26/23. Per Corrinne Mitts, APN at office visit on 7/20/23:    \"Assessment:  Now s/p Stage 3 Trans-Arterial Ethanol Embolization of Right Frontal-Parietal Scalp AVM/AVF  Neurologically intact  No S/S of skin necrosis present  Right parietal-temporal scalp AVM with significant decrease in posterior nodules & improvement in discoloration of anterior procedural site       Plan:     - Plan for repeat [facility] and stage IV of embolization with repeat Trans-Arterial Ethanol, under general anesthesia, end of Sept 2023 following pt's/family vacation per their request\"    Routed to [facility] [employer] pool.

## 2023-09-05 ENCOUNTER — PATIENT MESSAGE (OUTPATIENT)
Dept: SURGERY | Facility: CLINIC | Age: 28
End: 2023-09-05

## 2023-09-19 ENCOUNTER — LAB ENCOUNTER (OUTPATIENT)
Dept: LAB | Facility: HOSPITAL | Age: 28
End: 2023-09-19
Payer: MEDICAID

## 2023-09-19 DIAGNOSIS — Q27.30 AVM (ARTERIOVENOUS MALFORMATION): ICD-10-CM

## 2023-09-19 LAB
ALBUMIN SERPL-MCNC: 3.8 G/DL (ref 3.4–5)
ALBUMIN/GLOB SERPL: 1.1 {RATIO} (ref 1–2)
ALP LIVER SERPL-CCNC: 94 U/L
ALT SERPL-CCNC: 48 U/L
ANION GAP SERPL CALC-SCNC: 6 MMOL/L (ref 0–18)
APTT PPP: 27.3 SECONDS (ref 23.3–35.6)
AST SERPL-CCNC: 20 U/L (ref 15–37)
BASOPHILS # BLD AUTO: 0.11 X10(3) UL (ref 0–0.2)
BASOPHILS NFR BLD AUTO: 1.6 %
BILIRUB SERPL-MCNC: 0.2 MG/DL (ref 0.1–2)
BUN BLD-MCNC: 12 MG/DL (ref 7–18)
BUN/CREAT SERPL: 12.4 (ref 10–20)
CALCIUM BLD-MCNC: 9.3 MG/DL (ref 8.5–10.1)
CHLORIDE SERPL-SCNC: 107 MMOL/L (ref 98–112)
CO2 SERPL-SCNC: 29 MMOL/L (ref 21–32)
CREAT BLD-MCNC: 0.97 MG/DL
DEPRECATED RDW RBC AUTO: 39.6 FL (ref 35.1–46.3)
EGFRCR SERPLBLD CKD-EPI 2021: 109 ML/MIN/1.73M2 (ref 60–?)
EOSINOPHIL # BLD AUTO: 0.44 X10(3) UL (ref 0–0.7)
EOSINOPHIL NFR BLD AUTO: 6.4 %
ERYTHROCYTE [DISTWIDTH] IN BLOOD BY AUTOMATED COUNT: 12.7 % (ref 11–15)
FASTING STATUS PATIENT QL REPORTED: NO
GLOBULIN PLAS-MCNC: 3.6 G/DL (ref 2.8–4.4)
GLUCOSE BLD-MCNC: 81 MG/DL (ref 70–99)
HCT VFR BLD AUTO: 44.8 %
HGB BLD-MCNC: 15.3 G/DL
IMM GRANULOCYTES # BLD AUTO: 0.03 X10(3) UL (ref 0–1)
IMM GRANULOCYTES NFR BLD: 0.4 %
INR BLD: 0.89 (ref 0.85–1.16)
LYMPHOCYTES # BLD AUTO: 2.05 X10(3) UL (ref 1–4)
LYMPHOCYTES NFR BLD AUTO: 29.6 %
MCH RBC QN AUTO: 29.4 PG (ref 26–34)
MCHC RBC AUTO-ENTMCNC: 34.2 G/DL (ref 31–37)
MCV RBC AUTO: 86.2 FL
MONOCYTES # BLD AUTO: 0.54 X10(3) UL (ref 0.1–1)
MONOCYTES NFR BLD AUTO: 7.8 %
NEUTROPHILS # BLD AUTO: 3.75 X10 (3) UL (ref 1.5–7.7)
NEUTROPHILS # BLD AUTO: 3.75 X10(3) UL (ref 1.5–7.7)
NEUTROPHILS NFR BLD AUTO: 54.2 %
OSMOLALITY SERPL CALC.SUM OF ELEC: 293 MOSM/KG (ref 275–295)
PLATELET # BLD AUTO: 250 10(3)UL (ref 150–450)
POTASSIUM SERPL-SCNC: 4.1 MMOL/L (ref 3.5–5.1)
PROT SERPL-MCNC: 7.4 G/DL (ref 6.4–8.2)
PROTHROMBIN TIME: 12.1 SECONDS (ref 11.6–14.8)
RBC # BLD AUTO: 5.2 X10(6)UL
SODIUM SERPL-SCNC: 142 MMOL/L (ref 136–145)
WBC # BLD AUTO: 6.9 X10(3) UL (ref 4–11)

## 2023-09-19 PROCEDURE — 85730 THROMBOPLASTIN TIME PARTIAL: CPT

## 2023-09-19 PROCEDURE — 80053 COMPREHEN METABOLIC PANEL: CPT

## 2023-09-19 PROCEDURE — 85025 COMPLETE CBC W/AUTO DIFF WBC: CPT

## 2023-09-19 PROCEDURE — 85610 PROTHROMBIN TIME: CPT

## 2023-09-19 PROCEDURE — 36415 COLL VENOUS BLD VENIPUNCTURE: CPT

## 2023-09-20 NOTE — PAT NURSING NOTE
PAT call with patient. The following instructions were given to his mom Gilmar Gallagher and sent through his My Chart. She denied questions and verbalized understanding. Preprocedure Instructions    Visitor Instructions    Adult Patients: 2 Adult Care Partners can accompany the patient day of procedure. 2 Care Partners may visit [phone] during inpatient stay    PreOp Instructions    You are scheduled for: an Neuro Interventional Radiology Procedure    Date of Procedure: 09/25/23  Please arrive at 7:30 am    Diet Instructions: Do not eat or drink anything after midnight    Medications: Medications you are allowed to take can be taken with a sip of water the morning of your procedure    Medications to Stop: Hold herbal supplements and vitamins    Contrast Allergy: Please take your allergy medications as instructed    Skin Prep: Shower with antibacterial soap using a clean washcloth, prior to procedure    Arrival Time: You will receive a call the Friday afternoon before your procedure after 3 pm to confirm what time you should arrive the day of your procedure    Driving After Procedure: If sedation is given, you WILL NOT be able to drive home. You will need a responsible adult  to drive you home. Discharge Teaching: Your nurse will give you specific instructions before discharge; Any questions, please call the physician's office       Park in the [place] parking lot. Check in at the [place] reception desk. Our  will be there to check you in for your procedure. Please bring your insurance cards and ID with you.  [employer] parking is available starting at 5 am.

## 2023-09-25 ENCOUNTER — ANESTHESIA (OUTPATIENT)
Dept: INTERVENTIONAL RADIOLOGY/VASCULAR | Facility: HOSPITAL | Age: 28
End: 2023-09-25
Payer: MEDICAID

## 2023-09-25 ENCOUNTER — HOSPITAL ENCOUNTER (INPATIENT)
Dept: INTERVENTIONAL RADIOLOGY/VASCULAR | Facility: HOSPITAL | Age: 28
LOS: 1 days | Discharge: HOME | DRG: 026 | End: 2023-09-26
Payer: MEDICAID

## 2023-09-25 DIAGNOSIS — Q27.30 AVM (ARTERIOVENOUS MALFORMATION): ICD-10-CM

## 2023-09-25 LAB
BASE EXCESS BLD CALC-SCNC: -1 MMOL/L
CA-I BLD-SCNC: 1.21 MMOL/L (ref 1.12–1.32)
CO2 BLD-SCNC: 26 MMOL/L (ref 22–32)
GLUCOSE BLD-MCNC: 145 MG/DL (ref 70–99)
HCO3 BLD-SCNC: 24.9 MEQ/L
HCT VFR BLD CALC: 42 %
ISTAT ACTIVATED CLOTTING TIME: 131 SECONDS (ref 74–137)
ISTAT ACTIVATED CLOTTING TIME: 149 SECONDS (ref 74–137)
ISTAT ACTIVATED CLOTTING TIME: 155 SECONDS (ref 74–137)
ISTAT ACTIVATED CLOTTING TIME: 161 SECONDS (ref 74–137)
ISTAT ACTIVATED CLOTTING TIME: 161 SECONDS (ref 74–137)
ISTAT ACTIVATED CLOTTING TIME: 167 SECONDS (ref 74–137)
ISTAT ACTIVATED CLOTTING TIME: 191 SECONDS (ref 74–137)
MRSA DNA SPEC QL NAA+PROBE: NEGATIVE
PCO2 BLD: 46.3 MMHG
PH BLD: 7.34 [PH]
PO2 BLD: 462 MMHG
POTASSIUM BLD-SCNC: 4.5 MMOL/L (ref 3.6–5.1)
SAO2 % BLD: 100 %
SARS-COV-2 RNA RESP QL NAA+PROBE: NOT DETECTED
SODIUM BLD-SCNC: 136 MMOL/L (ref 136–145)

## 2023-09-25 PROCEDURE — 99254 IP/OBS CNSLTJ NEW/EST MOD 60: CPT | Performed by: HOSPITALIST

## 2023-09-25 PROCEDURE — 03LG3DZ OCCLUSION OF INTRACRANIAL ARTERY WITH INTRALUMINAL DEVICE, PERCUTANEOUS APPROACH: ICD-10-PCS

## 2023-09-25 PROCEDURE — S0028 INJECTION, FAMOTIDINE, 20 MG: HCPCS | Performed by: ANESTHESIOLOGY

## 2023-09-25 RX ORDER — FAMOTIDINE 10 MG/ML
INJECTION, SOLUTION INTRAVENOUS AS NEEDED
Status: DISCONTINUED | OUTPATIENT
Start: 2023-09-25 | End: 2023-09-25 | Stop reason: SURG

## 2023-09-25 RX ORDER — DEXAMETHASONE SODIUM PHOSPHATE 4 MG/ML
VIAL (ML) INJECTION AS NEEDED
Status: DISCONTINUED | OUTPATIENT
Start: 2023-09-25 | End: 2023-09-25 | Stop reason: SURG

## 2023-09-25 RX ORDER — ONDANSETRON 2 MG/ML
INJECTION INTRAMUSCULAR; INTRAVENOUS AS NEEDED
Status: DISCONTINUED | OUTPATIENT
Start: 2023-09-25 | End: 2023-09-25 | Stop reason: SURG

## 2023-09-25 RX ORDER — MORPHINE SULFATE 2 MG/ML
2 INJECTION, SOLUTION INTRAMUSCULAR; INTRAVENOUS EVERY 2 HOUR PRN
Status: DISCONTINUED | OUTPATIENT
Start: 2023-09-25 | End: 2023-09-26

## 2023-09-25 RX ORDER — ALCOHOL 0.98 ML/ML
INJECTION INTRASPINAL
Status: COMPLETED
Start: 2023-09-25 | End: 2023-09-25

## 2023-09-25 RX ORDER — ACETAMINOPHEN 650 MG/1
650 SUPPOSITORY RECTAL EVERY 4 HOURS PRN
Status: DISCONTINUED | OUTPATIENT
Start: 2023-09-25 | End: 2023-09-26

## 2023-09-25 RX ORDER — PROCHLORPERAZINE EDISYLATE 5 MG/ML
5 INJECTION INTRAMUSCULAR; INTRAVENOUS EVERY 8 HOURS PRN
Status: DISCONTINUED | OUTPATIENT
Start: 2023-09-25 | End: 2023-09-26

## 2023-09-25 RX ORDER — ENEMA 19; 7 G/133ML; G/133ML
1 ENEMA RECTAL ONCE AS NEEDED
Status: DISCONTINUED | OUTPATIENT
Start: 2023-09-25 | End: 2023-09-26

## 2023-09-25 RX ORDER — HEPARIN SODIUM 1000 [USP'U]/ML
INJECTION, SOLUTION INTRAVENOUS; SUBCUTANEOUS
Status: COMPLETED
Start: 2023-09-25 | End: 2023-09-25

## 2023-09-25 RX ORDER — ROCURONIUM BROMIDE 10 MG/ML
INJECTION, SOLUTION INTRAVENOUS AS NEEDED
Status: DISCONTINUED | OUTPATIENT
Start: 2023-09-25 | End: 2023-09-25 | Stop reason: SURG

## 2023-09-25 RX ORDER — ACETAMINOPHEN 500 MG
1000 TABLET ORAL EVERY 4 HOURS PRN
Status: DISCONTINUED | OUTPATIENT
Start: 2023-09-25 | End: 2023-09-26

## 2023-09-25 RX ORDER — LABETALOL HYDROCHLORIDE 5 MG/ML
10 INJECTION, SOLUTION INTRAVENOUS EVERY 10 MIN PRN
Status: DISCONTINUED | OUTPATIENT
Start: 2023-09-25 | End: 2023-09-26

## 2023-09-25 RX ORDER — SENNOSIDES 8.6 MG
17.2 TABLET ORAL NIGHTLY PRN
Status: DISCONTINUED | OUTPATIENT
Start: 2023-09-25 | End: 2023-09-26

## 2023-09-25 RX ORDER — IODIXANOL 320 MG/ML
100 INJECTION, SOLUTION INTRAVASCULAR
Status: COMPLETED | OUTPATIENT
Start: 2023-09-25 | End: 2023-09-25

## 2023-09-25 RX ORDER — ECHINACEA PURPUREA EXTRACT 125 MG
1 TABLET ORAL
Status: DISCONTINUED | OUTPATIENT
Start: 2023-09-25 | End: 2023-09-26

## 2023-09-25 RX ORDER — ACETAMINOPHEN 325 MG/1
650 TABLET ORAL EVERY 4 HOURS PRN
Status: DISCONTINUED | OUTPATIENT
Start: 2023-09-25 | End: 2023-09-26

## 2023-09-25 RX ORDER — DEXAMETHASONE SODIUM PHOSPHATE 4 MG/ML
10 VIAL (ML) INJECTION ONCE
Status: COMPLETED | OUTPATIENT
Start: 2023-09-25 | End: 2023-09-25

## 2023-09-25 RX ORDER — LIDOCAINE HYDROCHLORIDE 10 MG/ML
INJECTION, SOLUTION EPIDURAL; INFILTRATION; INTRACAUDAL; PERINEURAL AS NEEDED
Status: DISCONTINUED | OUTPATIENT
Start: 2023-09-25 | End: 2023-09-25 | Stop reason: SURG

## 2023-09-25 RX ORDER — HEPARIN SODIUM 1000 [USP'U]/ML
INJECTION, SOLUTION INTRAVENOUS; SUBCUTANEOUS AS NEEDED
Status: DISCONTINUED | OUTPATIENT
Start: 2023-09-25 | End: 2023-09-25 | Stop reason: SURG

## 2023-09-25 RX ORDER — MELATONIN
3 NIGHTLY PRN
Status: DISCONTINUED | OUTPATIENT
Start: 2023-09-25 | End: 2023-09-26

## 2023-09-25 RX ORDER — SODIUM CHLORIDE 9 MG/ML
INJECTION, SOLUTION INTRAVENOUS CONTINUOUS PRN
Status: DISCONTINUED | OUTPATIENT
Start: 2023-09-25 | End: 2023-09-25 | Stop reason: SURG

## 2023-09-25 RX ORDER — DIPHENHYDRAMINE HCL 50 MG
CAPSULE ORAL
Status: COMPLETED
Start: 2023-09-25 | End: 2023-09-25

## 2023-09-25 RX ORDER — ALCOHOL 0.98 ML/ML
20 INJECTION INTRASPINAL ONCE
Status: COMPLETED | OUTPATIENT
Start: 2023-09-25 | End: 2023-09-25

## 2023-09-25 RX ORDER — SODIUM CHLORIDE 9 MG/ML
INJECTION, SOLUTION INTRAVENOUS CONTINUOUS
Status: DISCONTINUED | OUTPATIENT
Start: 2023-09-25 | End: 2023-09-26

## 2023-09-25 RX ORDER — LIDOCAINE HYDROCHLORIDE 10 MG/ML
INJECTION, SOLUTION INFILTRATION; PERINEURAL
Status: COMPLETED
Start: 2023-09-25 | End: 2023-09-25

## 2023-09-25 RX ORDER — DIPHENHYDRAMINE HCL 50 MG
50 CAPSULE ORAL ONCE
Status: COMPLETED | OUTPATIENT
Start: 2023-09-25 | End: 2023-09-25

## 2023-09-25 RX ORDER — POLYETHYLENE GLYCOL 3350 17 G/17G
17 POWDER, FOR SOLUTION ORAL DAILY PRN
Status: DISCONTINUED | OUTPATIENT
Start: 2023-09-25 | End: 2023-09-26

## 2023-09-25 RX ORDER — MORPHINE SULFATE 2 MG/ML
1 INJECTION, SOLUTION INTRAMUSCULAR; INTRAVENOUS EVERY 2 HOUR PRN
Status: DISCONTINUED | OUTPATIENT
Start: 2023-09-25 | End: 2023-09-26

## 2023-09-25 RX ORDER — DEXTROAMPHETAMINE SACCHARATE, AMPHETAMINE ASPARTATE, DEXTROAMPHETAMINE SULFATE AND AMPHETAMINE SULFATE 1.25; 1.25; 1.25; 1.25 MG/1; MG/1; MG/1; MG/1
5 TABLET ORAL
Status: DISCONTINUED | OUTPATIENT
Start: 2023-09-26 | End: 2023-09-26

## 2023-09-25 RX ORDER — HEPARIN SODIUM 5000 [USP'U]/ML
INJECTION, SOLUTION INTRAVENOUS; SUBCUTANEOUS
Status: COMPLETED
Start: 2023-09-25 | End: 2023-09-25

## 2023-09-25 RX ORDER — BISACODYL 10 MG
10 SUPPOSITORY, RECTAL RECTAL
Status: DISCONTINUED | OUTPATIENT
Start: 2023-09-25 | End: 2023-09-26

## 2023-09-25 RX ORDER — DIPHENHYDRAMINE HYDROCHLORIDE 50 MG/ML
INJECTION INTRAMUSCULAR; INTRAVENOUS AS NEEDED
Status: DISCONTINUED | OUTPATIENT
Start: 2023-09-25 | End: 2023-09-25 | Stop reason: SURG

## 2023-09-25 RX ORDER — ONDANSETRON 2 MG/ML
4 INJECTION INTRAMUSCULAR; INTRAVENOUS EVERY 6 HOURS PRN
Status: DISCONTINUED | OUTPATIENT
Start: 2023-09-25 | End: 2023-09-26

## 2023-09-25 RX ADMIN — MORPHINE SULFATE 1 MG: 2 INJECTION, SOLUTION INTRAMUSCULAR; INTRAVENOUS at 15:49:00

## 2023-09-25 RX ADMIN — ROCURONIUM BROMIDE 20 MG: 10 INJECTION, SOLUTION INTRAVENOUS at 12:13:00

## 2023-09-25 RX ADMIN — PROCHLORPERAZINE EDISYLATE 5 MG: 5 INJECTION INTRAMUSCULAR; INTRAVENOUS at 15:49:00

## 2023-09-25 RX ADMIN — DEXAMETHASONE SODIUM PHOSPHATE 4 MG: 4 MG/ML VIAL (ML) INJECTION at 08:56:00

## 2023-09-25 RX ADMIN — ROCURONIUM BROMIDE 10 MG: 10 INJECTION, SOLUTION INTRAVENOUS at 10:46:00

## 2023-09-25 RX ADMIN — FAMOTIDINE 20 MG: 10 INJECTION, SOLUTION INTRAVENOUS at 08:56:00

## 2023-09-25 RX ADMIN — DIPHENHYDRAMINE HYDROCHLORIDE 50 MG: 50 INJECTION INTRAMUSCULAR; INTRAVENOUS at 08:56:00

## 2023-09-25 RX ADMIN — ROCURONIUM BROMIDE 100 MG: 10 INJECTION, SOLUTION INTRAVENOUS at 08:34:00

## 2023-09-25 RX ADMIN — ONDANSETRON 4 MG: 2 INJECTION INTRAMUSCULAR; INTRAVENOUS at 14:26:00

## 2023-09-25 RX ADMIN — DEXAMETHASONE SODIUM PHOSPHATE 4 MG: 4 MG/ML VIAL (ML) INJECTION at 14:26:00

## 2023-09-25 RX ADMIN — ROCURONIUM BROMIDE 20 MG: 10 INJECTION, SOLUTION INTRAVENOUS at 11:11:00

## 2023-09-25 RX ADMIN — DEXAMETHASONE SODIUM PHOSPHATE 10 MG: 4 MG/ML VIAL (ML) INJECTION at 21:21:00

## 2023-09-25 RX ADMIN — SODIUM CHLORIDE: 9 INJECTION, SOLUTION INTRAVENOUS at 21:12:00

## 2023-09-25 RX ADMIN — ROCURONIUM BROMIDE 10 MG: 10 INJECTION, SOLUTION INTRAVENOUS at 10:26:00

## 2023-09-25 RX ADMIN — HEPARIN SODIUM 4000 UNITS: 1000 INJECTION, SOLUTION INTRAVENOUS; SUBCUTANEOUS at 09:54:00

## 2023-09-25 RX ADMIN — ROCURONIUM BROMIDE 20 MG: 10 INJECTION, SOLUTION INTRAVENOUS at 13:51:00

## 2023-09-25 RX ADMIN — SODIUM CHLORIDE: 9 INJECTION, SOLUTION INTRAVENOUS at 08:23:00

## 2023-09-25 RX ADMIN — DIPHENHYDRAMINE HCL 50 MG: 50 MG CAPSULE ORAL at 08:00:00

## 2023-09-25 RX ADMIN — IODIXANOL 160 ML: 320 INJECTION, SOLUTION INTRAVASCULAR at 15:04:00

## 2023-09-25 RX ADMIN — LIDOCAINE HYDROCHLORIDE 100 MG: 10 INJECTION, SOLUTION EPIDURAL; INFILTRATION; INTRACAUDAL; PERINEURAL at 08:33:00

## 2023-09-25 NOTE — H&P
History & Physical Examination    Patient Name: Trace Whelan  MRN: [id number]  CSN: [id number]  YOB: 1995    Diagnosis:  s/p Stage 3 Trans-Arterial Ethanol Embolization of Right Frontal-Parietal Scalp AVM/AVF     Present Illness: Marylen Marek is here today with his mom for stage 4 embolization. Has been NPO since midnight 9/25  Labs from 9/19 reviewed: BMP, CBC, PT/INR all WNL. No current facility-administered medications for this encounter. Allergies:   Radiology Contrast *    SWELLING    Past Medical History:   Diagnosis Date    ADHD     Anxiety     Autism     Autism spectrum disorder      No past surgical history on file. Family History   Problem Relation Age of Onset    Stroke Maternal Grandmother     Colon Cancer Maternal Grandfather     Diabetes Maternal Grandfather     Cancer Paternal Grandfather         pancreatic     Social History    Tobacco Use      Smoking status: Never      Smokeless tobacco: Never    Alcohol use: Never    Neurological Exam:  Mental status: Oriented to person, place, and time   Speech: Fluent, no dysarthria  CN: II-XII grossly intact  Memory and comprehension: Intact   Motor: No drift, no focal arm or leg weakness. Sensory: Intact to light touch    [ x ] I have reviewed the History and Physical done within the last 30 days. Any changes noted above.     Jamilah Wilson, APRN  9/25/2023, 7:42 AM  Spectre [id number]

## 2023-09-25 NOTE — PLAN OF CARE
Care assumed at approx 1530. Pt arrived to room s/p ethanol embolization of R-frontal parietal scalp AVM/AVF. A&Ox4. Afebrile. ST/NSR on tele. L radial rubia in place. C/o frontal HA. PRN tylenol provided per pt request, but near immediately regurgitated by pt with tabs visible. Compazine admin x1 with good nausea relief per pt. PRN morphine x1 administered with good pain relief reported by patient. Neuro assessment intact. R frontal swelling/fluid noted to head. Pt reports some mild tingling in R hand (reported after prior angios per pts mom) and mild discomfort in R forearm. R arm was used for contrast injection, to which pt has known sensitivity. Tolerating PO intake well. Adequate UOP via godfrey catheter. IV SL. Patient in good spirits as evening progressed. Appears to be feeling better. and Mom updated on POC.

## 2023-09-25 NOTE — ANESTHESIA PROCEDURE NOTES
Peripheral IV  Date/Time: 9/25/2023 8:38 AM  Inserted by: Elan Lock MD    Placement  Needle size: 18 G  Laterality: right  Location: hand  Site prep: alcohol  Technique: anatomical landmarks  Attempts: 1

## 2023-09-25 NOTE — ANESTHESIA PROCEDURE NOTES
Arterial Line    Performed by: Francesco Lundberg MD  Authorized by: Francesco Lundberg MD    General Information and Staff    Procedure Start:   Anesthesiologist:  Kwaku Lock MD  Performed By:  Anesthesiologist  Patient Location:  OR  Indication: continuous blood pressure monitoring and blood sampling needed    Site Identification: surface landmarks    Preanesthetic Checklist: 2 patient identifiers, IV checked, risks and benefits discussed, monitors and equipment checked, pre-op evaluation, timeout performed, anesthesia consent and sterile technique used    Procedure Details    Catheter Size:  20 G  Catheter Length:  1 and 3/4 inch  Catheter Type:  Arrow  Seldinger Technique?: No    Laterality:  Left  Site:  Radial artery  Site Prep: chlorhexidine    Line Secured:  Wrist Brace, tape and Tegaderm    Assessment    Events: patient tolerated procedure well with no complications      Medications      Additional Comments

## 2023-09-25 NOTE — ANESTHESIA PROCEDURE NOTES
Airway  Date/Time: 9/25/2023 8:35 AM  Urgency: elective      General Information and Staff    Patient location during procedure: OR  Anesthesiologist: Sarah Lock MD  Performed: anesthesiologist   Performed by: Julian Lee MD  Authorized by: Julian Lee MD      Indications and Patient Condition  Indications for airway management: anesthesia  Spontaneous Ventilation: absent  Sedation level: deep  Preoxygenated: yes  Patient position: sniffing  Mask difficulty assessment: 0 - not attempted    Final Airway Details  Final airway type: endotracheal airway      Successful airway: ETT  Cuffed: yes   Successful intubation technique: direct laryngoscopy  Endotracheal tube insertion site: oral  Blade: Radha  Blade size: #3  ETT size (mm): 8.0    Cormack-Lehane Classification: grade I - full view of glottis  Placement verified by: capnometry   Measured from: lips  ETT to lips (cm): 23  Number of attempts at approach: 1

## 2023-09-26 VITALS
BODY MASS INDEX: 28.32 KG/M2 | HEART RATE: 103 BPM | TEMPERATURE: 99 F | OXYGEN SATURATION: 96 % | RESPIRATION RATE: 28 BRPM | WEIGHT: 170 LBS | DIASTOLIC BLOOD PRESSURE: 91 MMHG | SYSTOLIC BLOOD PRESSURE: 139 MMHG | HEIGHT: 65 IN

## 2023-09-26 PROBLEM — E83.39 HYPOPHOSPHATEMIA: Status: ACTIVE | Noted: 2023-09-26

## 2023-09-26 PROBLEM — R00.0 SINUS TACHYCARDIA: Status: ACTIVE | Noted: 2023-09-26

## 2023-09-26 PROBLEM — F84.0 AUTISM (HCC): Status: ACTIVE | Noted: 2019-08-20

## 2023-09-26 PROBLEM — D72.829 LEUKOCYTOSIS: Status: ACTIVE | Noted: 2023-09-26

## 2023-09-26 PROBLEM — F84.0 AUTISM: Status: ACTIVE | Noted: 2019-08-20

## 2023-09-26 LAB
ALBUMIN SERPL-MCNC: 3.3 G/DL (ref 3.4–5)
ALBUMIN/GLOB SERPL: 0.9 {RATIO} (ref 1–2)
ALP LIVER SERPL-CCNC: 81 U/L
ALT SERPL-CCNC: 32 U/L
ANION GAP SERPL CALC-SCNC: 7 MMOL/L (ref 0–18)
AST SERPL-CCNC: 21 U/L (ref 15–37)
BASOPHILS # BLD AUTO: 0.03 X10(3) UL (ref 0–0.2)
BASOPHILS # BLD AUTO: 0.07 X10(3) UL (ref 0–0.2)
BASOPHILS NFR BLD AUTO: 0.2 %
BASOPHILS NFR BLD AUTO: 0.4 %
BILIRUB SERPL-MCNC: 0.4 MG/DL (ref 0.1–2)
BUN BLD-MCNC: 12 MG/DL (ref 7–18)
CALCIUM BLD-MCNC: 8.6 MG/DL (ref 8.5–10.1)
CHLORIDE SERPL-SCNC: 109 MMOL/L (ref 98–112)
CO2 SERPL-SCNC: 24 MMOL/L (ref 21–32)
CREAT BLD-MCNC: 0.9 MG/DL
EGFRCR SERPLBLD CKD-EPI 2021: 119 ML/MIN/1.73M2 (ref 60–?)
EOSINOPHIL # BLD AUTO: 0 X10(3) UL (ref 0–0.7)
EOSINOPHIL # BLD AUTO: 0.01 X10(3) UL (ref 0–0.7)
EOSINOPHIL NFR BLD AUTO: 0 %
EOSINOPHIL NFR BLD AUTO: 0.1 %
ERYTHROCYTE [DISTWIDTH] IN BLOOD BY AUTOMATED COUNT: 12.5 %
ERYTHROCYTE [DISTWIDTH] IN BLOOD BY AUTOMATED COUNT: 12.7 %
GLOBULIN PLAS-MCNC: 3.5 G/DL (ref 2.8–4.4)
GLUCOSE BLD-MCNC: 139 MG/DL (ref 70–99)
HCT VFR BLD AUTO: 41.4 %
HCT VFR BLD AUTO: 42.2 %
HGB BLD-MCNC: 13.8 G/DL
HGB BLD-MCNC: 14.1 G/DL
IMM GRANULOCYTES # BLD AUTO: 0.05 X10(3) UL (ref 0–1)
IMM GRANULOCYTES # BLD AUTO: 0.06 X10(3) UL (ref 0–1)
IMM GRANULOCYTES NFR BLD: 0.3 %
IMM GRANULOCYTES NFR BLD: 0.4 %
LYMPHOCYTES # BLD AUTO: 1.21 X10(3) UL (ref 1–4)
LYMPHOCYTES # BLD AUTO: 2.72 X10(3) UL (ref 1–4)
LYMPHOCYTES NFR BLD AUTO: 16.9 %
LYMPHOCYTES NFR BLD AUTO: 7.5 %
MAGNESIUM SERPL-MCNC: 1.7 MG/DL (ref 1.6–2.6)
MCH RBC QN AUTO: 28.9 PG (ref 26–34)
MCH RBC QN AUTO: 29.3 PG (ref 26–34)
MCHC RBC AUTO-ENTMCNC: 33.3 G/DL (ref 31–37)
MCHC RBC AUTO-ENTMCNC: 33.4 G/DL (ref 31–37)
MCV RBC AUTO: 86.8 FL
MCV RBC AUTO: 87.7 FL
MONOCYTES # BLD AUTO: 0.76 X10(3) UL (ref 0.1–1)
MONOCYTES # BLD AUTO: 1.49 X10(3) UL (ref 0.1–1)
MONOCYTES NFR BLD AUTO: 4.7 %
MONOCYTES NFR BLD AUTO: 9.2 %
NEUTROPHILS # BLD AUTO: 11.78 X10 (3) UL (ref 1.5–7.7)
NEUTROPHILS # BLD AUTO: 11.78 X10(3) UL (ref 1.5–7.7)
NEUTROPHILS # BLD AUTO: 14.11 X10 (3) UL (ref 1.5–7.7)
NEUTROPHILS # BLD AUTO: 14.11 X10(3) UL (ref 1.5–7.7)
NEUTROPHILS NFR BLD AUTO: 73 %
NEUTROPHILS NFR BLD AUTO: 87.3 %
OSMOLALITY SERPL CALC.SUM OF ELEC: 292 MOSM/KG (ref 275–295)
PHOSPHATE SERPL-MCNC: 1.5 MG/DL (ref 2.5–4.9)
PHOSPHATE SERPL-MCNC: 2.4 MG/DL (ref 2.5–4.9)
PLATELET # BLD AUTO: 242 10(3)UL (ref 150–450)
PLATELET # BLD AUTO: 247 10(3)UL (ref 150–450)
POTASSIUM SERPL-SCNC: 3.7 MMOL/L (ref 3.5–5.1)
PROCALCITONIN SERPL-MCNC: <0.05 NG/ML (ref ?–0.16)
PROT SERPL-MCNC: 6.8 G/DL (ref 6.4–8.2)
RBC # BLD AUTO: 4.77 X10(6)UL
RBC # BLD AUTO: 4.81 X10(6)UL
SODIUM SERPL-SCNC: 140 MMOL/L (ref 136–145)
WBC # BLD AUTO: 16.1 X10(3) UL (ref 4–11)
WBC # BLD AUTO: 16.2 X10(3) UL (ref 4–11)

## 2023-09-26 PROCEDURE — 99291 CRITICAL CARE FIRST HOUR: CPT | Performed by: OTHER

## 2023-09-26 PROCEDURE — 99291 CRITICAL CARE FIRST HOUR: CPT

## 2023-09-26 PROCEDURE — 99232 SBSQ HOSP IP/OBS MODERATE 35: CPT | Performed by: INTERNAL MEDICINE

## 2023-09-26 RX ORDER — MAGNESIUM OXIDE 400 MG/1
400 TABLET ORAL ONCE
Status: COMPLETED | OUTPATIENT
Start: 2023-09-26 | End: 2023-09-26

## 2023-09-26 RX ADMIN — DEXTROAMPHETAMINE SACCHARATE, AMPHETAMINE ASPARTATE, DEXTROAMPHETAMINE SULFATE AND AMPHETAMINE SULFATE 5 MG: 1.25; 1.25; 1.25; 1.25 TABLET ORAL at 07:40:00

## 2023-09-26 RX ADMIN — MAGNESIUM OXIDE 400 MG: 400 TABLET ORAL at 07:40:00

## 2023-09-26 NOTE — PLAN OF CARE
Assumed care of pt around 1930. A+Ox4. Neuros q1h. R hand tingling. R groin soft, C/D/I, no hematoma. Pulses palpable. SR-ST. -160. A-line in place. Bekah. 2100 received phone call from Dr. Janette Briscoe, IVF and dexamethasone ordered, see MAR. No pain over night. Tingling in R hand decreasing throughout the night.  A-line and bekah out this AM.

## 2023-09-26 NOTE — PROGRESS NOTES
09/26/23 1232   Clinical Encounter Type   Visited With Patient and family together   Routine Visit   (Consult List)   Continue Visiting No  (If requested)   Referral From Nurse   Patient Spiritual Encounters   Spiritual Assessment Completed Yes   Family Spiritual Encounters   Family Coping Accepting;Open/discussion; Anxiety   Family Participation in Care Consistently   Family Support During Treatment Consistently   Taxonomy   Intended Effects Promote a sense of peace   Methods Offer spiritual/Congregation support; Offer emotional support   Interventions [place]; Share words of hope and inspiration      Visit Summary:    Pt was alert and responsive. Pt claimed to be anxious about an upcoming surgery. Pt asked for prayer. Pt shared that this is not the first surgery nor the last and the idea of that makes him feel very tired and scared. I actively listened to the patient and offered words of encouragement. Provided prayer and did let the family know that there is a  at [facility].     [id number] Resident  [id number]

## 2023-09-26 NOTE — PROCEDURES
[facility]     Interventional Neuroradiology Procedure Note      Procedure: Stage 4 / TRANSARTERIAL ETHANOL EMBOLIZATION OF RIGHT FRONTOPARIETAL SCALP AVM/AVF     Pre-Op Diagnosis:  Right frontal-parietal scalp AVM/AVF     Post-Op Diagnosis: s/p IA EtOH embolization    Surgeon: Myron Fisher MD, PhD    Technique/Findings:   5F Right CFA Sheath   5F Envoy Guide catheter    Jerelene Batters / Synchro standard . 014inch    IA EtOH embolization of residual AVM/AVF nidal compartments via right STA-middle branch (7.5mL), deep temporal artery (6 mL), and right STA-posterior branch (3.5 mL)    Successful EtOH embolization of residual/recurrent compartments of the diffuse filtrating right frontal-parietal scalp AVM/AVF    Residual right frontal-parietal scalp AVM/AVF supply via the frontal and parietal branches of the right MMA via trans-calvarial osseous feeders    Full report to follow      Anesthesia:  General    Complications:  None    Medications:   Ancef 2g IV  Heparin 4000 units IV    Blood Loss:  < 25 mL     Assessment/Plan:  Admit NeuroICU / Neurochecks q2h  IVFs  Analgesia and anti-inflammatory NSAIDs or dexamethasone if needed    Lay flat or reverse Trendelenburg 4 hours   Assess vitals/access site/pulses and neurological status in post-procedure unit  Discharge 3/85 if no complications    Myron Fisher MD, PhD  9/25/2023  7:12 PM

## 2023-09-26 NOTE — PLAN OF CARE
Care assumed at approx 0730. Mom at bedside overnight. Patient is in  mood this AM. Neuro assessment stable. Pt reports tingling in R hand is decreasing. Ambulated halls of unit 3x, hands off. Tolerated activity well. VSS. Afebrile. NSR/ST on tele. Godfrey and rubia removed. Voiding well post godfrey removal. Adequate UOP. Denies pain. Up in chair for meals. Good appetite. Labs repeated in afternoon, stable per hospitalist perspective. All specialties cleared to discharge home. Discharge instructions reviewed with patient and Mom. All questions addressed. Lines removed. Discharge home.

## 2023-10-26 ENCOUNTER — TELEPHONE (OUTPATIENT)
Dept: SURGERY | Facility: CLINIC | Age: 28
End: 2023-10-26

## 2023-10-26 ENCOUNTER — OFFICE VISIT (OUTPATIENT)
Dept: SURGERY | Facility: CLINIC | Age: 28
End: 2023-10-26

## 2023-10-26 VITALS
HEART RATE: 88 BPM | SYSTOLIC BLOOD PRESSURE: 96 MMHG | BODY MASS INDEX: 28.32 KG/M2 | HEIGHT: 65 IN | WEIGHT: 170 LBS | DIASTOLIC BLOOD PRESSURE: 64 MMHG

## 2023-10-26 DIAGNOSIS — Q27.30 AVM (ARTERIOVENOUS MALFORMATION): Primary | ICD-10-CM

## 2023-10-26 PROCEDURE — 3008F BODY MASS INDEX DOCD: CPT

## 2023-10-26 PROCEDURE — 99214 OFFICE O/P EST MOD 30 MIN: CPT

## 2023-10-26 PROCEDURE — 3078F DIAST BP <80 MM HG: CPT

## 2023-10-26 PROCEDURE — 3074F SYST BP LT 130 MM HG: CPT

## 2023-10-26 NOTE — PROGRESS NOTES
DAI rooming For established patients  Patient here for f/up AVM/AVF embolization.    Last office visit on 7/20/23  Last procedure: Embolization on 7/20/23  Changes since LOV: no changes  Anticoagulants: none    Review of Systems:    Hand Dominance: right  General: no symptoms reported  Neuro: no symptoms reported  Head: no symptoms reported  Musculoskeletal: no symptoms reported  Cardiovascular: no symptoms reported  Gastrointestinal: no symptoms reported  Genitourinary: no symptoms reported  Respiratory: no symptoms reported  Eyes: no symptoms reported  Skin: no symptoms reported  Mouth & throat: no symptoms reported  Neck: no symptoms reported  Nose: no symptoms reported  Psychiatric: no symptoms reported    MRS-0  Barthel-100

## 2023-10-26 NOTE — TELEPHONE ENCOUNTER
Please schedule patient for diagnostic cerebral angiogram with IA ethanol embolization of residual STA supply and Juancho/glu or Bleomycin with surgicel emobolization of residual MMA supply for Dec 2023 under general anesthesia with Dr. Bola Laboyite. Pt will need to be pre-medicated for dye allergy, as per previous procedures. This should be the only case of the day. Thank you!

## 2023-12-10 ENCOUNTER — LAB ENCOUNTER (OUTPATIENT)
Dept: LAB | Facility: HOSPITAL | Age: 28
End: 2023-12-10
Payer: MEDICAID

## 2023-12-10 DIAGNOSIS — Q27.30 AVM (ARTERIOVENOUS MALFORMATION): ICD-10-CM

## 2023-12-10 LAB
ALBUMIN SERPL-MCNC: 4.3 G/DL (ref 3.2–4.8)
ALBUMIN/GLOB SERPL: 1.4 {RATIO} (ref 1–2)
ALP LIVER SERPL-CCNC: 85 U/L
ALT SERPL-CCNC: 30 U/L
ANION GAP SERPL CALC-SCNC: 4 MMOL/L (ref 0–18)
APTT PPP: 27.4 SECONDS (ref 23.3–35.6)
AST SERPL-CCNC: 21 U/L (ref ?–34)
BASOPHILS # BLD AUTO: 0.11 X10(3) UL (ref 0–0.2)
BASOPHILS NFR BLD AUTO: 1.5 %
BILIRUB SERPL-MCNC: 0.3 MG/DL (ref 0.3–1.2)
BUN BLD-MCNC: 10 MG/DL (ref 9–23)
BUN/CREAT SERPL: 10.2 (ref 10–20)
CALCIUM BLD-MCNC: 9.5 MG/DL (ref 8.7–10.4)
CHLORIDE SERPL-SCNC: 104 MMOL/L (ref 98–112)
CO2 SERPL-SCNC: 30 MMOL/L (ref 21–32)
CREAT BLD-MCNC: 0.98 MG/DL
DEPRECATED RDW RBC AUTO: 39.6 FL (ref 35.1–46.3)
EGFRCR SERPLBLD CKD-EPI 2021: 108 ML/MIN/1.73M2 (ref 60–?)
EOSINOPHIL # BLD AUTO: 0.42 X10(3) UL (ref 0–0.7)
EOSINOPHIL NFR BLD AUTO: 5.7 %
ERYTHROCYTE [DISTWIDTH] IN BLOOD BY AUTOMATED COUNT: 12.9 % (ref 11–15)
FASTING STATUS PATIENT QL REPORTED: NO
GLOBULIN PLAS-MCNC: 3 G/DL (ref 2.8–4.4)
GLUCOSE BLD-MCNC: 84 MG/DL (ref 70–99)
HCT VFR BLD AUTO: 43.1 %
HGB BLD-MCNC: 15.4 G/DL
IMM GRANULOCYTES # BLD AUTO: 0.03 X10(3) UL (ref 0–1)
IMM GRANULOCYTES NFR BLD: 0.4 %
INR BLD: 0.9 (ref 0.8–1.2)
LYMPHOCYTES # BLD AUTO: 2.35 X10(3) UL (ref 1–4)
LYMPHOCYTES NFR BLD AUTO: 32.1 %
MCH RBC QN AUTO: 30.4 PG (ref 26–34)
MCHC RBC AUTO-ENTMCNC: 35.7 G/DL (ref 31–37)
MCV RBC AUTO: 85.2 FL
MONOCYTES # BLD AUTO: 0.72 X10(3) UL (ref 0.1–1)
MONOCYTES NFR BLD AUTO: 9.8 %
NEUTROPHILS # BLD AUTO: 3.7 X10 (3) UL (ref 1.5–7.7)
NEUTROPHILS # BLD AUTO: 3.7 X10(3) UL (ref 1.5–7.7)
NEUTROPHILS NFR BLD AUTO: 50.5 %
OSMOLALITY SERPL CALC.SUM OF ELEC: 284 MOSM/KG (ref 275–295)
PLATELET # BLD AUTO: 256 10(3)UL (ref 150–450)
POTASSIUM SERPL-SCNC: 3.7 MMOL/L (ref 3.5–5.1)
PROT SERPL-MCNC: 7.3 G/DL (ref 5.7–8.2)
PROTHROMBIN TIME: 12.6 SECONDS (ref 11.6–14.8)
RBC # BLD AUTO: 5.06 X10(6)UL
SODIUM SERPL-SCNC: 138 MMOL/L (ref 136–145)
WBC # BLD AUTO: 7.3 X10(3) UL (ref 4–11)

## 2023-12-10 PROCEDURE — 85025 COMPLETE CBC W/AUTO DIFF WBC: CPT

## 2023-12-10 PROCEDURE — 36415 COLL VENOUS BLD VENIPUNCTURE: CPT

## 2023-12-10 PROCEDURE — 80053 COMPREHEN METABOLIC PANEL: CPT

## 2023-12-10 PROCEDURE — 85610 PROTHROMBIN TIME: CPT

## 2023-12-10 PROCEDURE — 85730 THROMBOPLASTIN TIME PARTIAL: CPT

## 2023-12-14 NOTE — PAT NURSING NOTE
PreOp Instructions     You are scheduled for: an Neuro Interventional Radiology Procedure     Date of Procedure: 12/18/23. Check in at 7:00 am     Diet Instructions: Do not eat or drink anything after midnight     Medications: Medications you are allowed to take can be taken with a sip of water the morning of your procedure. Take only sertraline     Contrast Allergy: Please take your allergy medications as instructed at 8:00 pm; 1:00 am and bring last doses with you     Skin Prep: Shower with antibacterial soap using a clean washcloth, prior to procedure     Discharge Teaching: Your nurse will give you specific instructions before discharge; Most people can resume normal activities in 2-3 days; Any questions, please call the physician's office

## 2023-12-15 ENCOUNTER — LAB ENCOUNTER (OUTPATIENT)
Dept: LAB | Facility: HOSPITAL | Age: 28
End: 2023-12-15
Payer: MEDICAID

## 2023-12-15 ENCOUNTER — PATIENT MESSAGE (OUTPATIENT)
Dept: SURGERY | Facility: CLINIC | Age: 28
End: 2023-12-15

## 2023-12-15 DIAGNOSIS — Q27.30 AVM (ARTERIOVENOUS MALFORMATION): ICD-10-CM

## 2023-12-15 PROCEDURE — 87635 SARS-COV-2 COVID-19 AMP PRB: CPT

## 2023-12-15 NOTE — TELEPHONE ENCOUNTER
Noted the patient message listed below. Noted the patient is scheduled for an DAI procedure on 12/18/2023.      Noted [facility] sent an RX for predniSONE & diphenhydrAMINE (CONTRAST ALLERGY PREMED PACK) 3 x 50 MG for the pt to start on 12/17/2023    Sent the patient a [employer] J[employer]B

## 2023-12-16 LAB — SARS-COV-2 RNA RESP QL NAA+PROBE: NOT DETECTED

## 2023-12-16 RX ORDER — PREDNISONE 10 MG/1
TABLET ORAL
Qty: 15 TABLET | Refills: 0 | OUTPATIENT
Start: 2023-12-16

## 2023-12-17 ENCOUNTER — ANESTHESIA EVENT (OUTPATIENT)
Dept: INTERVENTIONAL RADIOLOGY/VASCULAR | Facility: HOSPITAL | Age: 28
End: 2023-12-17
Payer: MEDICAID

## 2023-12-17 NOTE — TELEPHONE ENCOUNTER
Prednisone refill request denied, as refill was sent by [facility] on 12.15.23 to his pharmacy. Patient acknowledged.

## 2023-12-18 ENCOUNTER — ANESTHESIA (OUTPATIENT)
Dept: INTERVENTIONAL RADIOLOGY/VASCULAR | Facility: HOSPITAL | Age: 28
End: 2023-12-18
Payer: MEDICAID

## 2023-12-18 ENCOUNTER — HOSPITAL ENCOUNTER (INPATIENT)
Dept: INTERVENTIONAL RADIOLOGY/VASCULAR | Facility: HOSPITAL | Age: 28
LOS: 1 days | Discharge: HOME | End: 2023-12-19
Payer: MEDICAID

## 2023-12-18 DIAGNOSIS — Q27.30 AVM (ARTERIOVENOUS MALFORMATION): ICD-10-CM

## 2023-12-18 LAB
ISTAT ACTIVATED CLOTTING TIME: 147 SECONDS (ref 74–137)
ISTAT ACTIVATED CLOTTING TIME: 179 SECONDS (ref 74–137)
ISTAT ACTIVATED CLOTTING TIME: 195 SECONDS (ref 74–137)
ISTAT ACTIVATED CLOTTING TIME: 196 SECONDS (ref 74–137)
ISTAT ACTIVATED CLOTTING TIME: 201 SECONDS (ref 74–137)
MRSA DNA SPEC QL NAA+PROBE: NEGATIVE

## 2023-12-18 PROCEDURE — 03LG3DZ OCCLUSION OF INTRACRANIAL ARTERY WITH INTRALUMINAL DEVICE, PERCUTANEOUS APPROACH: ICD-10-PCS

## 2023-12-18 PROCEDURE — S0028 INJECTION, FAMOTIDINE, 20 MG: HCPCS | Performed by: INTERNAL MEDICINE

## 2023-12-18 PROCEDURE — 99254 IP/OBS CNSLTJ NEW/EST MOD 60: CPT | Performed by: INTERNAL MEDICINE

## 2023-12-18 PROCEDURE — 99291 CRITICAL CARE FIRST HOUR: CPT | Performed by: INTERNAL MEDICINE

## 2023-12-18 RX ORDER — HYDROMORPHONE HYDROCHLORIDE 1 MG/ML
0.4 INJECTION, SOLUTION INTRAMUSCULAR; INTRAVENOUS; SUBCUTANEOUS EVERY 5 MIN PRN
Status: ACTIVE | OUTPATIENT
Start: 2023-12-18 | End: 2023-12-18

## 2023-12-18 RX ORDER — LIDOCAINE HYDROCHLORIDE 10 MG/ML
INJECTION, SOLUTION INFILTRATION; PERINEURAL
Status: DISCONTINUED
Start: 2023-12-18 | End: 2023-12-18 | Stop reason: WASHOUT

## 2023-12-18 RX ORDER — HEPARIN SODIUM 1000 [USP'U]/ML
INJECTION, SOLUTION INTRAVENOUS; SUBCUTANEOUS AS NEEDED
Status: DISCONTINUED | OUTPATIENT
Start: 2023-12-18 | End: 2023-12-18 | Stop reason: SURG

## 2023-12-18 RX ORDER — PROCHLORPERAZINE EDISYLATE 5 MG/ML
5 INJECTION INTRAMUSCULAR; INTRAVENOUS EVERY 8 HOURS PRN
Status: DISCONTINUED | OUTPATIENT
Start: 2023-12-18 | End: 2023-12-19

## 2023-12-18 RX ORDER — IODIXANOL 320 MG/ML
300 INJECTION, SOLUTION INTRAVASCULAR
Status: COMPLETED | OUTPATIENT
Start: 2023-12-18 | End: 2023-12-18

## 2023-12-18 RX ORDER — HYDROMORPHONE HYDROCHLORIDE 1 MG/ML
0.2 INJECTION, SOLUTION INTRAMUSCULAR; INTRAVENOUS; SUBCUTANEOUS EVERY 5 MIN PRN
Status: ACTIVE | OUTPATIENT
Start: 2023-12-18 | End: 2023-12-18

## 2023-12-18 RX ORDER — ROCURONIUM BROMIDE 10 MG/ML
INJECTION, SOLUTION INTRAVENOUS AS NEEDED
Status: DISCONTINUED | OUTPATIENT
Start: 2023-12-18 | End: 2023-12-18 | Stop reason: SURG

## 2023-12-18 RX ORDER — NALOXONE HYDROCHLORIDE 0.4 MG/ML
0.08 INJECTION, SOLUTION INTRAMUSCULAR; INTRAVENOUS; SUBCUTANEOUS AS NEEDED
Status: ACTIVE | OUTPATIENT
Start: 2023-12-18 | End: 2023-12-18

## 2023-12-18 RX ORDER — ESMOLOL HYDROCHLORIDE 10 MG/ML
INJECTION INTRAVENOUS AS NEEDED
Status: DISCONTINUED | OUTPATIENT
Start: 2023-12-18 | End: 2023-12-18 | Stop reason: SURG

## 2023-12-18 RX ORDER — MORPHINE SULFATE 2 MG/ML
1 INJECTION, SOLUTION INTRAMUSCULAR; INTRAVENOUS EVERY 2 HOUR PRN
Status: DISCONTINUED | OUTPATIENT
Start: 2023-12-18 | End: 2023-12-19

## 2023-12-18 RX ORDER — LABETALOL HYDROCHLORIDE 5 MG/ML
10 INJECTION, SOLUTION INTRAVENOUS EVERY 10 MIN PRN
Status: DISCONTINUED | OUTPATIENT
Start: 2023-12-18 | End: 2023-12-19

## 2023-12-18 RX ORDER — ONDANSETRON 2 MG/ML
INJECTION INTRAMUSCULAR; INTRAVENOUS AS NEEDED
Status: DISCONTINUED | OUTPATIENT
Start: 2023-12-18 | End: 2023-12-18 | Stop reason: SURG

## 2023-12-18 RX ORDER — SODIUM CHLORIDE 9 MG/ML
INJECTION, SOLUTION INTRAVENOUS CONTINUOUS PRN
Status: DISCONTINUED | OUTPATIENT
Start: 2023-12-18 | End: 2023-12-18 | Stop reason: SURG

## 2023-12-18 RX ORDER — HEPARIN SODIUM 1000 [USP'U]/ML
INJECTION, SOLUTION INTRAVENOUS; SUBCUTANEOUS
Status: COMPLETED
Start: 2023-12-18 | End: 2023-12-18

## 2023-12-18 RX ORDER — HYDROMORPHONE HYDROCHLORIDE 1 MG/ML
0.6 INJECTION, SOLUTION INTRAMUSCULAR; INTRAVENOUS; SUBCUTANEOUS EVERY 5 MIN PRN
Status: ACTIVE | OUTPATIENT
Start: 2023-12-18 | End: 2023-12-18

## 2023-12-18 RX ORDER — ALCOHOL 0.98 ML/ML
INJECTION INTRASPINAL
Status: DISCONTINUED
Start: 2023-12-18 | End: 2023-12-18 | Stop reason: WASHOUT

## 2023-12-18 RX ORDER — SODIUM CHLORIDE, SODIUM LACTATE, POTASSIUM CHLORIDE, CALCIUM CHLORIDE 600; 310; 30; 20 MG/100ML; MG/100ML; MG/100ML; MG/100ML
INJECTION, SOLUTION INTRAVENOUS CONTINUOUS
Status: DISCONTINUED | OUTPATIENT
Start: 2023-12-18 | End: 2023-12-19

## 2023-12-18 RX ORDER — ACETAMINOPHEN 325 MG/1
650 TABLET ORAL EVERY 4 HOURS PRN
Status: DISCONTINUED | OUTPATIENT
Start: 2023-12-18 | End: 2023-12-19

## 2023-12-18 RX ORDER — ACETAMINOPHEN 650 MG/1
650 SUPPOSITORY RECTAL EVERY 4 HOURS PRN
Status: DISCONTINUED | OUTPATIENT
Start: 2023-12-18 | End: 2023-12-19

## 2023-12-18 RX ORDER — DEXAMETHASONE SODIUM PHOSPHATE 4 MG/ML
VIAL (ML) INJECTION AS NEEDED
Status: DISCONTINUED | OUTPATIENT
Start: 2023-12-18 | End: 2023-12-18 | Stop reason: SURG

## 2023-12-18 RX ORDER — ONDANSETRON 2 MG/ML
4 INJECTION INTRAMUSCULAR; INTRAVENOUS EVERY 6 HOURS PRN
Status: DISCONTINUED | OUTPATIENT
Start: 2023-12-18 | End: 2023-12-19

## 2023-12-18 RX ORDER — CEFAZOLIN SODIUM/WATER 2 G/20 ML
SYRINGE (ML) INTRAVENOUS AS NEEDED
Status: DISCONTINUED | OUTPATIENT
Start: 2023-12-18 | End: 2023-12-18 | Stop reason: SURG

## 2023-12-18 RX ORDER — MIDAZOLAM HYDROCHLORIDE 1 MG/ML
INJECTION INTRAMUSCULAR; INTRAVENOUS AS NEEDED
Status: DISCONTINUED | OUTPATIENT
Start: 2023-12-18 | End: 2023-12-18 | Stop reason: SURG

## 2023-12-18 RX ORDER — SODIUM TETRADECYL SULFATE 30 MG/ML
10 INJECTION, SOLUTION INTRAVENOUS ONCE
Status: DISCONTINUED | OUTPATIENT
Start: 2023-12-18 | End: 2023-12-19

## 2023-12-18 RX ORDER — FAMOTIDINE 10 MG/ML
INJECTION, SOLUTION INTRAVENOUS AS NEEDED
Status: DISCONTINUED | OUTPATIENT
Start: 2023-12-18 | End: 2023-12-18 | Stop reason: SURG

## 2023-12-18 RX ORDER — MORPHINE SULFATE 2 MG/ML
2 INJECTION, SOLUTION INTRAMUSCULAR; INTRAVENOUS EVERY 2 HOUR PRN
Status: DISCONTINUED | OUTPATIENT
Start: 2023-12-18 | End: 2023-12-19

## 2023-12-18 RX ORDER — LIDOCAINE AND PRILOCAINE 25; 25 MG/G; MG/G
CREAM TOPICAL
Status: COMPLETED
Start: 2023-12-18 | End: 2023-12-18

## 2023-12-18 RX ORDER — HEPARIN SODIUM 5000 [USP'U]/ML
INJECTION, SOLUTION INTRAVENOUS; SUBCUTANEOUS
Status: COMPLETED
Start: 2023-12-18 | End: 2023-12-18

## 2023-12-18 RX ORDER — ALCOHOL 0.98 ML/ML
10 INJECTION INTRASPINAL ONCE
Status: DISCONTINUED | OUTPATIENT
Start: 2023-12-18 | End: 2023-12-19

## 2023-12-18 RX ORDER — LIDOCAINE HYDROCHLORIDE 10 MG/ML
INJECTION, SOLUTION EPIDURAL; INFILTRATION; INTRACAUDAL; PERINEURAL AS NEEDED
Status: DISCONTINUED | OUTPATIENT
Start: 2023-12-18 | End: 2023-12-18 | Stop reason: SURG

## 2023-12-18 RX ADMIN — ROCURONIUM BROMIDE 60 MG: 10 INJECTION, SOLUTION INTRAVENOUS at 08:47:00

## 2023-12-18 RX ADMIN — LIDOCAINE AND PRILOCAINE: 25; 25 CREAM TOPICAL at 08:16:00

## 2023-12-18 RX ADMIN — ACETAMINOPHEN 650 MG: 325 TABLET ORAL at 17:36:00

## 2023-12-18 RX ADMIN — SODIUM CHLORIDE: 9 INJECTION, SOLUTION INTRAVENOUS at 08:30:00

## 2023-12-18 RX ADMIN — IODIXANOL 180 ML: 320 INJECTION, SOLUTION INTRAVASCULAR at 12:35:00

## 2023-12-18 RX ADMIN — ROCURONIUM BROMIDE 20 MG: 10 INJECTION, SOLUTION INTRAVENOUS at 10:53:00

## 2023-12-18 RX ADMIN — MIDAZOLAM HYDROCHLORIDE 2 MG: 1 INJECTION INTRAMUSCULAR; INTRAVENOUS at 08:47:00

## 2023-12-18 RX ADMIN — DEXAMETHASONE SODIUM PHOSPHATE 8 MG: 4 MG/ML VIAL (ML) INJECTION at 08:56:00

## 2023-12-18 RX ADMIN — CEFAZOLIN SODIUM/WATER 2 G: 2 G/20 ML SYRINGE (ML) INTRAVENOUS at 09:00:00

## 2023-12-18 RX ADMIN — HEPARIN SODIUM 4000 UNITS: 1000 INJECTION, SOLUTION INTRAVENOUS; SUBCUTANEOUS at 09:53:00

## 2023-12-18 RX ADMIN — ESMOLOL HYDROCHLORIDE 10 MG: 10 INJECTION INTRAVENOUS at 11:36:00

## 2023-12-18 RX ADMIN — LIDOCAINE HYDROCHLORIDE 50 MG: 10 INJECTION, SOLUTION EPIDURAL; INFILTRATION; INTRACAUDAL; PERINEURAL at 08:47:00

## 2023-12-18 RX ADMIN — ONDANSETRON 4 MG: 2 INJECTION INTRAMUSCULAR; INTRAVENOUS at 17:36:00

## 2023-12-18 RX ADMIN — SODIUM CHLORIDE: 9 INJECTION, SOLUTION INTRAVENOUS at 12:39:00

## 2023-12-18 RX ADMIN — FAMOTIDINE 20 MG: 10 INJECTION, SOLUTION INTRAVENOUS at 08:56:00

## 2023-12-18 RX ADMIN — ONDANSETRON 4 MG: 2 INJECTION INTRAMUSCULAR; INTRAVENOUS at 12:31:00

## 2023-12-18 NOTE — TELEPHONE ENCOUNTER
Received patient's mother's message stating that Dr. Jamilah Pacheco was able to order prednisone yesterday for pre-op dye allergy prep.

## 2023-12-18 NOTE — ANESTHESIA PREPROCEDURE EVALUATION
PRE-OP EVALUATION    Patient Name: Rupesh Anaya    Admit Diagnosis: AVF (arteriovenous fistula) ([facility] [I77.0]    Pre-op Diagnosis: * No surgery found *        Anesthesia Procedure: IVS NEURO    Embolization of intracranial AVM    Pre-op vitals reviewed. There is no height or weight on file to calculate BMI. Current medications reviewed. Hospital Medications:  No current facility-administered medications on file as of 12/18/2023. Outpatient Medications:     Medications Prior to Admission   Medication Sig Dispense Refill Last Dose    predniSONE & diphenhydrAMINE (CONTRAST ALLERGY PREMED PACK) 3 x 50 MG & 1 x 50 MG Oral Kit Take 50 mg by mouth As Directed. 50mg 13 hrs prior, 50 mg 7 hrs prior, 50mg 1 hr before 1 kit 0     Docusate Sodium (COLACE OR) Take by mouth daily. Clindamycin Phos-Benzoyl Perox (BENZACLIN WITH PUMP) 1-5 % External Gel 1 Application As Directed. sertraline 25 MG Oral Tab        Multiple Vitamins-Minerals (MULTI-VITAMIN/MINERALS) Oral Tab Take 1 tablet by mouth daily. Amphetamine-Dextroamphet ER 10 MG Oral Capsule SR 24 Hr Take 1 capsule (10 mg total) by mouth every morning. Sertraline HCl 50 MG Oral Tab Take 1.5 tablets (75 mg total) by mouth daily. Allergies: Radiology contrast iodinated dyes      Anesthesia Evaluation    Patient summary reviewed. Anesthetic Complications  (-) history of anesthetic complications         GI/Hepatic/Renal    Negative GI/hepatic/renal ROS. Cardiovascular    Negative cardiovascular ROS. Exercise tolerance: good     MET: >4                                           Endo/Other    Negative endo/other ROS. Pulmonary    Negative pulmonary ROS.                        Neuro/Psych    Negative neuro/psych ROS.    (+) anxiety                      As per Epic:  Patient Active Problem List:     Attention deficit hyperactivity disorder (ADHD), unspecified ADHD type     Conversion disorder     Generalized anxiety disorder     Autism spectrum disorder     AVM (arteriovenous malformation)     Acute left ankle pain     Other acne        No past surgical history on file. Social History     Socioeconomic History    Marital status: Single   Tobacco Use    Smoking status: Never    Smokeless tobacco: Never   Substance and Sexual Activity    Alcohol use: Never    Drug use: Never     History   Drug Use Unknown       Available pre-op labs reviewed. Lab Results   Component Value Date    WBC 7.3 12/10/2023    RBC 5.06 12/10/2023    HGB 15.4 12/10/2023    HCT 43.1 12/10/2023    MCV 85.2 12/10/2023    MCH 30.4 12/10/2023    MCHC 35.7 12/10/2023    RDW 12.9 12/10/2023    .0 12/10/2023     Lab Results   Component Value Date     12/10/2023    K 3.7 12/10/2023     12/10/2023    CO2 30.0 12/10/2023    BUN 10 12/10/2023    CREATSERUM 0.98 12/10/2023    GLU 84 12/10/2023    CA 9.5 12/10/2023     Lab Results   Component Value Date    INR 0.90 12/10/2023         Airway      Mallampati: III  Mouth opening: >3 FB  TM distance: > 6 cm  Neck ROM: full Cardiovascular          (-) murmur   Dental  Comment: Dentition is grossly intact; Patient does not demonstrate loose teeth to inspection. Pulmonary  Comment: Unlabored ventilatory effort, no retractions. Breath sounds clear to auscultation bilaterally. Other findings  asserts right handed, which is demonstrated            ASA: 3   Plan: general  NPO status verified and patient meets guidelines. Post-procedure pain management plan discussed with surgeon and patient. Comment: Risks include but limited to oral and dental injury, nausea/vomiting, anaphylaxis, prolonged ventilation and myocardial infarction. All questions were answered to the patient's satisfaction. Patient expressed understanding and wishes to proceed.    Plan/risks discussed with: patient and mother  Use of blood product(s) discussed with: mother              Present on Admission:  **None**

## 2023-12-18 NOTE — PROCEDURES
Procedure: Stage 5 / NEETA EMBOLIZATION OF RIGHT FRONTOPARIETAL SCALP AVM/AVF      Pre-Op Diagnosis:  Right frontal-parietal scalp AVM/AVF      Post-Op Diagnosis: s/p MMA embolization     Surgeon: Myron Fisher MD, PhD     Technique/Findings:   5F Right CFA Sheath   5F Envoy Guide catheter     Apollo / Synchro . 010 inch    IA Ethylene Vinyl embolization of calvarial AVM/AVF nidal compartments via right MMA-parietal branch (1mL Neeta-34 and 1.6mL Neeta-18)    Successful Neeta embolization of residual/recurrent compartments of the diffuse filtrating right frontal-parietal scalp AVM/AVF resulting in near complete obliteration     Subtle residual right frontal-parietal scalp AVM/AVF supply may remain via the anterior and middle branches of the right STA, but with markedly reduced AV shunting/early venous drainage     Full report to follow     Anesthesia:  General     Complications:  None     Medications: Ancef 2g IV  Heparin 4000 units IV  Decadron 8mg IV     Blood Loss:  < 25 mL      Assessment/Plan:  Admit NeuroICU / Neurochecks q2h  IVFs  Analgesia and anti-inflammatory NSAIDs or dexamethasone if needed     Lay flat or reverse Trendelenburg 5 hours   Assess vitals/access site/pulses and neurological status in post-procedure unit  Discharge 81/92  if no complications      Jose Maria Walton MD, PhD  Department of Radiology, Neurology, and Neurological Surgery  [address]    12/18/2023 1:25 PM

## 2023-12-18 NOTE — H&P
History & Physical Examination    Patient Name: Lia Jerry  MRN: [id number]  CSN: [id number]  YOB: 1995    Diagnosis:  s/p Stage 4 Trans-Arterial Ethanol Embolization of Right Frontal-Parietal Scalp AVM/AVF      Present Illness: Sushil Fall is here today, with his parents, for stage 5 embolization (possible ethanol, possible Sotradecol, possible bleomycin). Has been NPO since midnight 12/18  Labs from 12/10 reviewed: BMP, CBC, PT/INR. No current facility-administered medications for this encounter. Allergies: Allergies   Allergen Reactions    Radiology Contrast Iodinated Dyes SWELLING       Past Medical History:   Diagnosis Date    ADHD     Anxiety     Autism     Autism spectrum disorder      No past surgical history on file. Family History   Problem Relation Age of Onset    Stroke Maternal Grandmother     Colon Cancer Maternal Grandfather     Diabetes Maternal Grandfather     Cancer Paternal Grandfather         pancreatic     Social History     Tobacco Use    Smoking status: Never    Smokeless tobacco: Never   Substance Use Topics    Alcohol use: Never       Neurological Exam:  Mental status: Oriented to person, place, and time   Speech: Fluent, no dysarthria  CN: II-XII grossly intact  Memory and comprehension: Intact   Motor: No drift, no focal arm or leg weakness. Sensory: Intact to light touch    [ x ] I have reviewed the History and Physical done within the last 30 days. Any changes noted above.     Kaylynn Hatfield, APRN  12/18/2023, 7:43 AM  Spectre [id number] DISPLAY PLAN FREE TEXT intact

## 2023-12-18 NOTE — ANESTHESIA PROCEDURE NOTES
Airway  Date/Time: 12/18/2023 8:51 AM  Urgency: elective      General Information and Staff    Patient location during procedure: OR  Anesthesiologist: Satno Etienne MD  Performed: anesthesiologist   Performed by: Santo Etienne MD  Authorized by: Santo Etienne MD      Indications and Patient Condition  Indications for airway management: anesthesia  Sedation level: deep  Preoxygenated: yes  Patient position: sniffing  Mask difficulty assessment: 1 - vent by mask    Final Airway Details  Final airway type: endotracheal airway      Successful airway: ETT  Cuffed: yes   Successful intubation technique: direct laryngoscopy  Endotracheal tube insertion site: oral  Blade: Radha  Blade size: #3  ETT size (mm): 7.5    Cormack-Lehane Classification: grade I - full view of glottis  Placement verified by: capnometry   Cuff volume (mL): 10  Measured from: lips  ETT to lips (cm): 22  Number of attempts at approach: 1    Additional Comments  Atraumatic, no tape to hold due to Bleomycin protocol, secured with strap

## 2023-12-18 NOTE — ANESTHESIA PROCEDURE NOTES
Arterial Line    Date/Time: 12/18/2023 9:00 AM    Performed by: Monse Grider MD  Authorized by: Monse Grider MD    General Information and Staff    Procedure Start:  12/18/2023 9:00 AM  Procedure End:  12/18/2023 9:00 AM  Anesthesiologist:  Monse Grider MD  Performed By:  Anesthesiologist  Patient Location:  OR  Indication: continuous blood pressure monitoring and blood sampling needed    Site Identification: real time ultrasound guided and image stored and retrievable    Preanesthetic Checklist: 2 patient identifiers, IV checked, risks and benefits discussed, monitors and equipment checked, pre-op evaluation, timeout performed, anesthesia consent and sterile technique used    Procedure Details    Catheter Size:  20 G  Catheter Length:  1 and 3/4 inch  Catheter Type:  Arrow  Seldinger Technique?: Yes    Laterality:  Left  Site:  Radial artery  Site Prep: chlorhexidine    Line Secured:  Wrist Brace, tape and Tegaderm    Assessment    Events: patient tolerated procedure well with no complications      Medications  12/18/2023 9:00 AM      Additional Comments

## 2023-12-18 NOTE — ANESTHESIA POSTPROCEDURE EVALUATION
[address] Patient Status:  Outpatient   Age/Gender 29year old male MRN [id number]   [facility] Caleb Tinoco MD, PhD   Kary Contreras # 0 PCP Stacia Bailey MD       Anesthesia Post-op Note        Procedure Summary       Date: 12/18/23 Room / Location: [facility] Interventional Suites    Anesthesia Start: 0830 Anesthesia Stop: 6256    Procedure: IVS NEURO-intracranial embolization of AVM Diagnosis:       AVM (arteriovenous malformation)      AVM (arteriovenous malformation)    Scheduled Providers: Sushil Falk MD Anesthesiologist: Sushil Falk MD    Anesthesia Type: general ASA Status: 3            Anesthesia Type: general    Vitals Value Taken Time   /72 12/18/23 1320   Temp 98 12/18/23 1320   Pulse 85 12/18/23 1320   Resp 19 12/18/23 1320   SpO2 99 12/18/23 1320       Patient Location: ICU    Anesthesia Type: general    Airway Patency: patent    Postop Pain Control: adequate    Mental Status: mildly sedated but able to meaningfully participate in the post-anesthesia evaluation    Nausea/Vomiting: none    Cardiopulmonary/Hydration status: stable euvolemic    Complications: no apparent anesthesia related complications    Postop vital signs: stable    Dental Exam: Unchanged from Preop    Sign out given to ICU staff.

## 2023-12-19 VITALS
OXYGEN SATURATION: 98 % | RESPIRATION RATE: 18 BRPM | TEMPERATURE: 99 F | DIASTOLIC BLOOD PRESSURE: 83 MMHG | SYSTOLIC BLOOD PRESSURE: 119 MMHG | HEART RATE: 91 BPM

## 2023-12-19 LAB
ALBUMIN SERPL-MCNC: 3.2 G/DL (ref 3.4–5)
ALBUMIN/GLOB SERPL: 1 {RATIO} (ref 1–2)
ALP LIVER SERPL-CCNC: 89 U/L
ALT SERPL-CCNC: 22 U/L
ANION GAP SERPL CALC-SCNC: 3 MMOL/L (ref 0–18)
AST SERPL-CCNC: <3 U/L (ref 15–37)
BASOPHILS # BLD AUTO: 0.06 X10(3) UL (ref 0–0.2)
BASOPHILS NFR BLD AUTO: 0.4 %
BILIRUB SERPL-MCNC: 0.3 MG/DL (ref 0.1–2)
BUN BLD-MCNC: 12 MG/DL (ref 9–23)
CALCIUM BLD-MCNC: 8 MG/DL (ref 8.5–10.1)
CHLORIDE SERPL-SCNC: 111 MMOL/L (ref 98–112)
CO2 SERPL-SCNC: 27 MMOL/L (ref 21–32)
CREAT BLD-MCNC: 1.06 MG/DL
EGFRCR SERPLBLD CKD-EPI 2021: 98 ML/MIN/1.73M2 (ref 60–?)
EOSINOPHIL # BLD AUTO: 0.01 X10(3) UL (ref 0–0.7)
EOSINOPHIL NFR BLD AUTO: 0.1 %
ERYTHROCYTE [DISTWIDTH] IN BLOOD BY AUTOMATED COUNT: 13 %
GLOBULIN PLAS-MCNC: 3.1 G/DL (ref 2.8–4.4)
GLUCOSE BLD-MCNC: 105 MG/DL (ref 70–99)
HCT VFR BLD AUTO: 41.1 %
HGB BLD-MCNC: 13.9 G/DL
IMM GRANULOCYTES # BLD AUTO: 0.06 X10(3) UL (ref 0–1)
IMM GRANULOCYTES NFR BLD: 0.4 %
LYMPHOCYTES # BLD AUTO: 2.76 X10(3) UL (ref 1–4)
LYMPHOCYTES NFR BLD AUTO: 18.8 %
MAGNESIUM SERPL-MCNC: 2 MG/DL (ref 1.6–2.6)
MCH RBC QN AUTO: 29.9 PG (ref 26–34)
MCHC RBC AUTO-ENTMCNC: 33.8 G/DL (ref 31–37)
MCV RBC AUTO: 88.4 FL
MONOCYTES # BLD AUTO: 1.72 X10(3) UL (ref 0.1–1)
MONOCYTES NFR BLD AUTO: 11.7 %
NEUTROPHILS # BLD AUTO: 10.08 X10 (3) UL (ref 1.5–7.7)
NEUTROPHILS # BLD AUTO: 10.08 X10(3) UL (ref 1.5–7.7)
NEUTROPHILS NFR BLD AUTO: 68.6 %
OSMOLALITY SERPL CALC.SUM OF ELEC: 292 MOSM/KG (ref 275–295)
PHOSPHATE SERPL-MCNC: 2.7 MG/DL (ref 2.5–4.9)
PLATELET # BLD AUTO: 242 10(3)UL (ref 150–450)
POTASSIUM SERPL-SCNC: 3.6 MMOL/L (ref 3.5–5.1)
PROT SERPL-MCNC: 6.3 G/DL (ref 6.4–8.2)
RBC # BLD AUTO: 4.65 X10(6)UL
SODIUM SERPL-SCNC: 141 MMOL/L (ref 136–145)
WBC # BLD AUTO: 14.7 X10(3) UL (ref 4–11)

## 2023-12-19 PROCEDURE — 99232 SBSQ HOSP IP/OBS MODERATE 35: CPT | Performed by: INTERNAL MEDICINE

## 2023-12-19 PROCEDURE — 99291 CRITICAL CARE FIRST HOUR: CPT | Performed by: INTERNAL MEDICINE

## 2023-12-19 RX ORDER — DEXTROAMPHETAMINE SACCHARATE, AMPHETAMINE ASPARTATE, DEXTROAMPHETAMINE SULFATE AND AMPHETAMINE SULFATE 1.25; 1.25; 1.25; 1.25 MG/1; MG/1; MG/1; MG/1
5 TABLET ORAL
Status: DISCONTINUED | OUTPATIENT
Start: 2023-12-19 | End: 2023-12-19

## 2023-12-19 NOTE — PLAN OF CARE
Resumed care at 0730. Aox4, neuro intact. Denies pain. R groin site clean and dry, dressing removed this morning. Ambulating in hallway, steady gait. Voided post godfrey removal. Tolerating PO diet, denies nausea. Ok for discharge today per all services. Discharge instructions given and explained to patient, verbalized understanding. Ivs removed. Wheelchair to lobby, discharged with all belongings. Family at bedside to give ride home.

## 2023-12-19 NOTE — PROGRESS NOTES
Patient arrived from neuro IR at 1330. Neuro WNL, A&O x4, no deficits noted. Groin site clean, soft, no hematoma noted. Pulses +2 x4, all extremities warm, pink, full sensation. NSR, BP WNL. RA, sats 96% and above. Site checks, pulses, vitals and neuro WNL, patient sitting up in bed at 1848, puncture site remains soft, no hematoma, dry. Parents at bedside, supportive.

## 2023-12-27 RX ORDER — PREDNISONE 10 MG/1
TABLET ORAL
Qty: 15 TABLET | Refills: 0 | OUTPATIENT
Start: 2023-12-27

## 2024-01-09 ENCOUNTER — TELEPHONE (OUTPATIENT)
Dept: SURGERY | Facility: CLINIC | Age: 29
End: 2024-01-09

## 2024-01-09 ENCOUNTER — OFFICE VISIT (OUTPATIENT)
Dept: SURGERY | Facility: CLINIC | Age: 29
End: 2024-01-09
Payer: MEDICAID

## 2024-01-09 VITALS
DIASTOLIC BLOOD PRESSURE: 70 MMHG | SYSTOLIC BLOOD PRESSURE: 130 MMHG | WEIGHT: 170 LBS | HEIGHT: 65 IN | BODY MASS INDEX: 28.32 KG/M2 | HEART RATE: 76 BPM

## 2024-01-09 DIAGNOSIS — Q27.30 AVM (ARTERIOVENOUS MALFORMATION): Primary | ICD-10-CM

## 2024-01-09 PROCEDURE — 3078F DIAST BP <80 MM HG: CPT

## 2024-01-09 PROCEDURE — 99214 OFFICE O/P EST MOD 30 MIN: CPT

## 2024-01-09 PROCEDURE — 3075F SYST BP GE 130 - 139MM HG: CPT

## 2024-01-09 PROCEDURE — 3008F BODY MASS INDEX DOCD: CPT

## 2024-01-09 NOTE — PATIENT INSTRUCTIONS
Refill policies:    Allow 2-3 business days for refills; controlled substances may take longer.  Contact your pharmacy at least 5 days prior to running out of medication and have them send an electronic request or submit request through the “request refill” option in your [employer] account.  Refills are not addressed on weekends; covering physicians do not authorize routine medications on weekends.  No narcotics or controlled substances are refilled after noon on Fridays or by on call physicians.  By law, narcotics must be electronically prescribed.  A 30 day supply with no refills is the maximum allowed.  If your prescription is due for a refill, you may be due for a follow up appointment.  To best provide you care, patients receiving routine medications need to be seen at least once a year.  Patients receiving narcotic/controlled substance medications need to be seen at least once every 3 months.  In the event that your preferred pharmacy does not have the requested medication in stock (e.g. Backordered), it is your responsibility to find another pharmacy that has the requested medication available.  We will gladly send a new prescription to that pharmacy at your request.    Scheduling Tests:    If your physician has ordered radiology tests such as MRI or CT scans, please contact Central Scheduling at [phone] right away to schedule the test.  Once scheduled, the [facility] Centralized Referral Team will work with your insurance carrier to obtain pre-certification or prior authorization.  Depending on your insurance carrier, approval may take 3-10 days.  It is highly recommended patients assure they have received an authorization before having a test performed.  If test is done without insurance authorization, patient may be responsible for the entire amount billed.      Precertification and Prior Authorizations:  If your physician has recommended that you have a procedure or additional testing performed the [facility]  Centralized Referral Team will contact your insurance carrier to obtain pre-certification or prior authorization.    You are strongly encouraged to contact your insurance carrier to verify that your procedure/test has been approved and is a COVERED benefit.  Although the [facility] Centralized Referral Team does its due diligence, the insurance carrier gives the disclaimer that \"Although the procedure is authorized, this does not guarantee payment.\"    Ultimately the patient is responsible for payment.   Thank you for your understanding in this matter.  Paperwork Completion:  If you require FMLA or disability paperwork for your recovery, please make sure to either drop it off or have it faxed to our office at [phone]. Be sure the form has your name and date of birth on it.  The form will be faxed to our Forms Department and they will complete it for you.  There is a 25$ fee for all forms that need to be filled out.  Please be aware there is a 10-14 day turnaround time.  You will need to sign a release of information (SAMIR) form if your paperwork does not come with one.  You may call the Forms Department with any questions at [phone].  Their fax number is [phone].

## 2024-01-09 NOTE — PROGRESS NOTES
[facility]  Interventional Neuroradiology Clinic Note    Porfirio Roca MD  Primary Care      Date of Service: 1/9/2024    Dear Colleagues,     We had the pleasure of seeing Mark Gumzan in the Interventional Neuroradiology Clinic at [facility] for his hospital follow up s/p stage 5 Steen embolization of right frontoparietal scalp AVM/AVF.     Review of Systems:     Hand Dominance: right  General: no symptoms reported  Neuro: no symptoms reported  Head: ringing in ears  Musculoskeletal: no symptoms reported  Cardiovascular: no symptoms reported  Gastrointestinal: no symptoms reported  Genitourinary: no symptoms reported  Respiratory: no symptoms reported  Eyes: no symptoms reported  Skin: no symptoms reported  Mouth & throat: no symptoms reported  Neck: no symptoms reported  Nose: no symptoms reported  Psychiatric: no symptoms reported      MRS-0  Barthel-100        Past Medical/Surgical History:  Past Medical History:   Diagnosis Date    ADHD     Anxiety     Autism     Autism spectrum disorder        No past surgical history on file.    Social History:    reports that he has never smoked. He has never used smokeless tobacco. He reports that he does not drink alcohol and does not use drugs.    Family History:  Family History   Problem Relation Age of Onset    Stroke Maternal Grandmother     Colon Cancer Maternal Grandfather     Diabetes Maternal Grandfather     Cancer Paternal Grandfather         pancreatic       Medications:    Current Outpatient Medications:     Docusate Sodium (COLACE OR), Take by mouth daily., Disp: , Rfl:     Clindamycin Phos-Benzoyl Perox (BENZACLIN WITH PUMP) 1-5 % External Gel, 1 Application As Directed., Disp: , Rfl:     sertraline 25 MG Oral Tab, , Disp: , Rfl:     Multiple Vitamins-Minerals (MULTI-VITAMIN/MINERALS) Oral Tab, Take 1 tablet by mouth daily., Disp: , Rfl:     Amphetamine-Dextroamphet ER 10 MG Oral Capsule SR 24 Hr, Take 1 capsule (10 mg  total) by mouth every morning., Disp: , Rfl:     Sertraline HCl 50 MG Oral Tab, Take 1.5 tablets (75 mg total) by mouth daily., Disp: , Rfl:     predniSONE & diphenhydrAMINE (CONTRAST ALLERGY PREMED PACK) 3 x 50 MG & 1 x 50 MG Oral Kit, Take 50 mg by mouth As Directed. 50mg 13 hrs prior, 50 mg 7 hrs prior, 50mg 1 hr before (Patient not taking: Reported on 1/9/2024), Disp: 1 kit, Rfl: 0    Allergies:   Allergies   Allergen Reactions    Radiology Contrast Iodinated Dyes SWELLING       Physical Exam:  /70 (BP Location: Right arm, Patient Position: Sitting, Cuff Size: adult)   Pulse 76   Ht 65\"   Wt 170 lb (77.1 kg)   BMI 28.29 kg/m²   GENERAL:  The patient was pleasant and cooperative throughout the examination.    HEENT/NEURO:  The patient's head is normocephalic with anicteric sclerae, moist mucous membranes, and nasal cavities. Right parietal-temporal scalp AVM palpable with one large area of elevation noted anteriorly, resolved in posterior. No drainage noted. Reddish coloration in 1 very small area. No S/S of skin necrosis present. The patient is alert, awake, and oriented with normal intelligence, memory, language, and judgement.  On cranial nerve examination, full visual fields are noted by confrontational testing.  The patient's pupils are equally responsive and reactive to light.  Extraocular muscles are intact.  There is no evidence of afferent pupillary defect, anisocoria, myosis, or ptosis.  Facial expression and sensation are symmetric and intact bilaterally in the V1-V3 distributions.  The patient's hearing is grossly intact.  The palate elevates symmetrically and the voice is normal.  Sternocleidomastoid and trapezius muscle strength are intact.  The tongue demonstrates protrusion in the midline.  The patient's strength and sensation are intact in bilateral upper and lower extremities. There is no pronator drift on [place] testing.  Romberg testing is negative.  There is no evidence of nystagmus,  ataxia, dysarthria, or dysmetria with finger-to-nose testing.            Assessment:  Now s/p Stage 5 Juancho embolization of right frontoparietal scalp AVM/AVF   Neurologically intact   Residual erythema of the right frontal scalp unchanged, with no evidence of of skin breakdown, ulceration, or skin necrosis, bleeding, and/or discharge noted     Plan:    - Will plan for DCA with possible IA ethanol embolization (March 2024)     - No imaging needed prior     BILLY Shepherd      Imaging reviewed and case discussed with Jose Maria Tovar MD, PhD  Department of Radiology, Neurology, and Neurological Surgery  [facility]    1/9/2024 1:38 PM      I spent approximately 30 minutes reviewing the patient's chart and imaging, with at least half the time spent on counseling the patient on her pathology and conservative medical management/treatment plan.

## 2024-01-09 NOTE — PROGRESS NOTES
Patient here for a follow up after AVF/AVM treatment on 12.18.23 with Dr. Tovar.     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     Review of Systems:    Hand Dominance: right  General: no symptoms reported  Neuro: no symptoms reported  Head: ringing in ears  Musculoskeletal: no symptoms reported  Cardiovascular: no symptoms reported  Gastrointestinal: no symptoms reported  Genitourinary: no symptoms reported  Respiratory: no symptoms reported  Eyes: no symptoms reported  Skin: no symptoms reported  Mouth & throat: no symptoms reported  Neck: no symptoms reported  Nose: no symptoms reported  Psychiatric: no symptoms reported     MRS-0  Barthel-100

## 2024-03-29 ENCOUNTER — TELEPHONE (OUTPATIENT)
Dept: SURGERY | Facility: CLINIC | Age: 29
End: 2024-03-29

## 2024-03-29 NOTE — TELEPHONE ENCOUNTER
Received progress note DOS 03/28/24 from Kathrin Ruvalcaba NP, for review by provider    Placed in neuro surgery bin for clinical staff.

## 2024-04-02 ENCOUNTER — LAB ENCOUNTER (OUTPATIENT)
Dept: LAB | Facility: HOSPITAL | Age: 29
End: 2024-04-02
Payer: MEDICAID

## 2024-04-02 VITALS — BODY MASS INDEX: 27.49 KG/M2 | HEIGHT: 65 IN | WEIGHT: 165 LBS

## 2024-04-02 DIAGNOSIS — Q27.30 AVM (ARTERIOVENOUS MALFORMATION) (HCC): ICD-10-CM

## 2024-04-02 DIAGNOSIS — I77.0 AVF (ARTERIOVENOUS FISTULA) (HCC): ICD-10-CM

## 2024-04-02 LAB
ALBUMIN SERPL-MCNC: 4.7 G/DL (ref 3.2–4.8)
ALBUMIN/GLOB SERPL: 1.4 {RATIO} (ref 1–2)
ALP LIVER SERPL-CCNC: 103 U/L
ALT SERPL-CCNC: 28 U/L
ANION GAP SERPL CALC-SCNC: 5 MMOL/L (ref 0–18)
ANTIBODY SCREEN: NEGATIVE
APTT PPP: 27.7 SECONDS (ref 23.3–35.6)
AST SERPL-CCNC: 21 U/L (ref ?–34)
BASOPHILS # BLD AUTO: 0.11 X10(3) UL (ref 0–0.2)
BASOPHILS NFR BLD AUTO: 1.1 %
BILIRUB SERPL-MCNC: 0.3 MG/DL (ref 0.3–1.2)
BUN BLD-MCNC: 13 MG/DL (ref 9–23)
BUN/CREAT SERPL: 13.5 (ref 10–20)
CALCIUM BLD-MCNC: 10.4 MG/DL (ref 8.7–10.4)
CHLORIDE SERPL-SCNC: 107 MMOL/L (ref 98–112)
CO2 SERPL-SCNC: 28 MMOL/L (ref 21–32)
CREAT BLD-MCNC: 0.96 MG/DL
DEPRECATED RDW RBC AUTO: 37.8 FL (ref 35.1–46.3)
EGFRCR SERPLBLD CKD-EPI 2021: 110 ML/MIN/1.73M2 (ref 60–?)
EOSINOPHIL # BLD AUTO: 0.39 X10(3) UL (ref 0–0.7)
EOSINOPHIL NFR BLD AUTO: 3.9 %
ERYTHROCYTE [DISTWIDTH] IN BLOOD BY AUTOMATED COUNT: 12.3 % (ref 11–15)
FASTING STATUS PATIENT QL REPORTED: YES
GLOBULIN PLAS-MCNC: 3.4 G/DL (ref 2.8–4.4)
GLUCOSE BLD-MCNC: 76 MG/DL (ref 70–99)
HCT VFR BLD AUTO: 45.3 %
HGB BLD-MCNC: 15.9 G/DL
IMM GRANULOCYTES # BLD AUTO: 0.05 X10(3) UL (ref 0–1)
IMM GRANULOCYTES NFR BLD: 0.5 %
INR BLD: 0.94 (ref 0.8–1.2)
LYMPHOCYTES # BLD AUTO: 2.89 X10(3) UL (ref 1–4)
LYMPHOCYTES NFR BLD AUTO: 28.9 %
MCH RBC QN AUTO: 29.9 PG (ref 26–34)
MCHC RBC AUTO-ENTMCNC: 35.1 G/DL (ref 31–37)
MCV RBC AUTO: 85.2 FL
MONOCYTES # BLD AUTO: 0.76 X10(3) UL (ref 0.1–1)
MONOCYTES NFR BLD AUTO: 7.6 %
NEUTROPHILS # BLD AUTO: 5.8 X10 (3) UL (ref 1.5–7.7)
NEUTROPHILS # BLD AUTO: 5.8 X10(3) UL (ref 1.5–7.7)
NEUTROPHILS NFR BLD AUTO: 58 %
OSMOLALITY SERPL CALC.SUM OF ELEC: 289 MOSM/KG (ref 275–295)
PLATELET # BLD AUTO: 317 10(3)UL (ref 150–450)
POTASSIUM SERPL-SCNC: 4 MMOL/L (ref 3.5–5.1)
PROT SERPL-MCNC: 8.1 G/DL (ref 5.7–8.2)
PROTHROMBIN TIME: 13.1 SECONDS (ref 11.6–14.8)
RBC # BLD AUTO: 5.32 X10(6)UL
RH BLOOD TYPE: POSITIVE
SODIUM SERPL-SCNC: 140 MMOL/L (ref 136–145)
WBC # BLD AUTO: 10 X10(3) UL (ref 4–11)

## 2024-04-02 PROCEDURE — 86900 BLOOD TYPING SEROLOGIC ABO: CPT

## 2024-04-02 PROCEDURE — 86901 BLOOD TYPING SEROLOGIC RH(D): CPT

## 2024-04-02 PROCEDURE — 80053 COMPREHEN METABOLIC PANEL: CPT

## 2024-04-02 PROCEDURE — 86850 RBC ANTIBODY SCREEN: CPT

## 2024-04-02 PROCEDURE — 85730 THROMBOPLASTIN TIME PARTIAL: CPT

## 2024-04-02 PROCEDURE — 36415 COLL VENOUS BLD VENIPUNCTURE: CPT

## 2024-04-02 PROCEDURE — 85610 PROTHROMBIN TIME: CPT

## 2024-04-02 PROCEDURE — 85025 COMPLETE CBC W/AUTO DIFF WBC: CPT

## 2024-04-02 NOTE — PAT NURSING NOTE
Per PAT encounter/Meetappt message sent to pt:    PreOp Instructions     You are scheduled for: an Neuro Interventional Radiology Procedure     Date of Procedure: 04/08/24 Monday     Diet Instructions: Do not eat or drink anything after midnight     Medications: Medications you are allowed to take can be taken with a sip of water the morning of your procedure     Medications to Stop: Hold herbal supplements and vitamins; continue to hold until instructed to resume after the procedure is completed.     Other Medications: Do NOT take your Adderall dose on Monday morning 4/8.     Contrast Allergy: Please take your allergy medications as instructed (Prednisone will be 13 and 7 hours prior to procedure; see below for last dose).     Other Contrast Allergy Instructions: If your arrival time remains at 7 am, you will take your first dose of Prednisone Malcolm evening 4/7 at 7:30pm. Your second dose will be at 1:30 am on Monday morning 4/8. Bring your last dose of Prednisone and your Benedryl dose to the hospital Monday morning 4/8 to take at 7:30 am.     Skin Prep: Shower with antibacterial soap using a clean washcloth, prior to procedure     Arrival Time: The day prior to your procedure you will receive a phone call before 6:00 pm with your arrival time. If you haven't received a phone call, please check your voicemail messages., If you did not receive a voice mail and it is after 6:00 pm, please call the nursing supervisor at [phone].     Discharge Teaching: Your nurse will give you specific instructions before discharge, Most people can resume normal activities in 2-3 days, Any questions, please call the physician's office      parking is available starting at 6 am or park in the [place] parking garage at [facility]. Check in at the [facility] reception desk. Our  will be there to check you in for your procedure. Please bring your insurance cards and ID with you.                                                                                                                                       Please DO NOT respond to this message, the inbasket is not monitored for messages. For any questions, please call the physician's office.

## 2024-04-05 ENCOUNTER — LAB ENCOUNTER (OUTPATIENT)
Dept: LAB | Facility: HOSPITAL | Age: 29
End: 2024-04-05
Payer: MEDICAID

## 2024-04-05 DIAGNOSIS — Q27.30 AVM (ARTERIOVENOUS MALFORMATION) (HCC): ICD-10-CM

## 2024-04-05 DIAGNOSIS — I77.0 AVF (ARTERIOVENOUS FISTULA) (HCC): ICD-10-CM

## 2024-04-05 LAB — SARS-COV-2 RNA RESP QL NAA+PROBE: NOT DETECTED

## 2024-04-05 PROCEDURE — 87635 SARS-COV-2 COVID-19 AMP PRB: CPT

## 2024-04-07 ENCOUNTER — ANESTHESIA EVENT (OUTPATIENT)
Dept: INTERVENTIONAL RADIOLOGY/VASCULAR | Facility: HOSPITAL | Age: 29
End: 2024-04-07
Payer: MEDICAID

## 2024-04-08 ENCOUNTER — ANESTHESIA (OUTPATIENT)
Dept: INTERVENTIONAL RADIOLOGY/VASCULAR | Facility: HOSPITAL | Age: 29
End: 2024-04-08
Payer: MEDICAID

## 2024-04-08 ENCOUNTER — HOSPITAL ENCOUNTER (OUTPATIENT)
Dept: INTERVENTIONAL RADIOLOGY/VASCULAR | Facility: HOSPITAL | Age: 29
Discharge: HOME | End: 2024-04-08
Payer: MEDICAID

## 2024-04-10 NOTE — ANESTHESIA PREPROCEDURE EVALUATION
PRE-OP EVALUATION    Patient Name: Mark Guzman    Admit Diagnosis: AVF (arteriovenous fistula) (HCC) [I77.0]  AVM (arteriovenous malformation) (HCC) [Q27.30]    Pre-op Diagnosis: * No surgery found *        Anesthesia Procedure: IVS NEURO    * Surgery not found *    Pre-op vitals reviewed.        There is no height or weight on file to calculate BMI.    Current medications reviewed.  Hospital Medications:  No current facility-administered medications on file as of 4/11/2024.       Outpatient Medications:   (Not in a hospital admission)      Allergies: Radiology contrast iodinated dyes      Anesthesia Evaluation    Patient summary reviewed.    Anesthetic Complications  (-) history of anesthetic complications         GI/Hepatic/Renal                                 Cardiovascular                                                       Endo/Other                                  Pulmonary                           Neuro/Psych        (+) anxiety                         AVF (arteriovenous fistula) (HCC) AVM (arteriovenous malformation) (HCC)  Acute left ankle pain Attention deficit hyperactivity disorder (ADHD), unspecified ADHD type  Autism (HCC) Conversion disorder  Generalized anxiety disorder Hypophosphatemia  Leukocytosis Other acne  Sinus tachycardia               No past surgical history on file.  Social History     Socioeconomic History    Marital status: Single   Tobacco Use    Smoking status: Never    Smokeless tobacco: Never   Vaping Use    Vaping status: Never Used   Substance and Sexual Activity    Alcohol use: Never    Drug use: Never     History   Drug Use Unknown     Available pre-op labs reviewed.  Lab Results   Component Value Date    WBC 10.0 04/02/2024    RBC 5.32 04/02/2024    HGB 15.9 04/02/2024    HCT 45.3 04/02/2024    MCV 85.2 04/02/2024    MCH 29.9 04/02/2024    MCHC 35.1 04/02/2024    RDW 12.3 04/02/2024    .0 04/02/2024     Lab Results   Component Value Date      04/02/2024    K 4.0 04/02/2024     04/02/2024    CO2 28.0 04/02/2024    BUN 13 04/02/2024    CREATSERUM 0.96 04/02/2024    GLU 76 04/02/2024    CA 10.4 04/02/2024     Lab Results   Component Value Date    INR 0.94 04/02/2024         Airway      Mallampati: I  Mouth opening: >3 FB  TM distance: > 6 cm  Neck ROM: full Cardiovascular    Cardiovascular exam normal.         Dental    Dentition appears grossly intact         Pulmonary    Pulmonary exam normal.                 Other findings              ASA: 3   Plan: MAC  NPO status verified and           Plan/risks discussed with: patient, father and mother                Present on Admission:  **None**

## 2024-04-11 ENCOUNTER — HOSPITAL ENCOUNTER (INPATIENT)
Dept: INTERVENTIONAL RADIOLOGY/VASCULAR | Facility: HOSPITAL | Age: 29
LOS: 1 days | Discharge: HOME | End: 2024-04-12
Payer: MEDICAID

## 2024-04-11 DIAGNOSIS — Q27.30 AVM (ARTERIOVENOUS MALFORMATION) (HCC): ICD-10-CM

## 2024-04-11 DIAGNOSIS — I77.0 AVF (ARTERIOVENOUS FISTULA) (HCC): ICD-10-CM

## 2024-04-11 LAB
ISTAT ACTIVATED CLOTTING TIME: 174 SECONDS (ref 74–137)
ISTAT ACTIVATED CLOTTING TIME: 179 SECONDS (ref 74–137)
ISTAT ACTIVATED CLOTTING TIME: 190 SECONDS (ref 74–137)
ISTAT ACTIVATED CLOTTING TIME: 190 SECONDS (ref 74–137)
ISTAT ACTIVATED CLOTTING TIME: 201 SECONDS (ref 74–137)
ISTAT ACTIVATED CLOTTING TIME: 201 SECONDS (ref 74–137)
ISTAT ACTIVATED CLOTTING TIME: 206 SECONDS (ref 74–137)
SARS-COV-2 RNA RESP QL NAA+PROBE: NOT DETECTED

## 2024-04-11 PROCEDURE — B31R1ZZ FLUOROSCOPY OF INTRACRANIAL ARTERIES USING LOW OSMOLAR CONTRAST: ICD-10-PCS

## 2024-04-11 PROCEDURE — 03VG3DZ RESTRICTION OF INTRACRANIAL ARTERY WITH INTRALUMINAL DEVICE, PERCUTANEOUS APPROACH: ICD-10-PCS

## 2024-04-11 PROCEDURE — 99291 CRITICAL CARE FIRST HOUR: CPT | Performed by: INTERNAL MEDICINE

## 2024-04-11 RX ORDER — MORPHINE SULFATE 2 MG/ML
2 INJECTION, SOLUTION INTRAMUSCULAR; INTRAVENOUS EVERY 2 HOUR PRN
Status: DISCONTINUED | OUTPATIENT
Start: 2024-04-11 | End: 2024-04-12

## 2024-04-11 RX ORDER — ROCURONIUM BROMIDE 10 MG/ML
INJECTION, SOLUTION INTRAVENOUS AS NEEDED
Status: DISCONTINUED | OUTPATIENT
Start: 2024-04-11 | End: 2024-04-11 | Stop reason: SURG

## 2024-04-11 RX ORDER — MORPHINE SULFATE 2 MG/ML
1 INJECTION, SOLUTION INTRAMUSCULAR; INTRAVENOUS EVERY 2 HOUR PRN
Status: DISCONTINUED | OUTPATIENT
Start: 2024-04-11 | End: 2024-04-12

## 2024-04-11 RX ORDER — ONDANSETRON 2 MG/ML
INJECTION INTRAMUSCULAR; INTRAVENOUS AS NEEDED
Status: DISCONTINUED | OUTPATIENT
Start: 2024-04-11 | End: 2024-04-11 | Stop reason: SURG

## 2024-04-11 RX ORDER — HEPARIN SODIUM 5000 [USP'U]/ML
INJECTION, SOLUTION INTRAVENOUS; SUBCUTANEOUS
Status: COMPLETED
Start: 2024-04-11 | End: 2024-04-11

## 2024-04-11 RX ORDER — ALCOHOL 0.98 ML/ML
10 INJECTION INTRASPINAL ONCE
Status: DISCONTINUED | OUTPATIENT
Start: 2024-04-11 | End: 2024-04-12 | Stop reason: ALTCHOICE

## 2024-04-11 RX ORDER — LIDOCAINE AND PRILOCAINE 25; 25 MG/G; MG/G
CREAM TOPICAL
Status: COMPLETED
Start: 2024-04-11 | End: 2024-04-11

## 2024-04-11 RX ORDER — PROCHLORPERAZINE EDISYLATE 5 MG/ML
5 INJECTION INTRAMUSCULAR; INTRAVENOUS EVERY 8 HOURS PRN
Status: DISCONTINUED | OUTPATIENT
Start: 2024-04-11 | End: 2024-04-12

## 2024-04-11 RX ORDER — LABETALOL HYDROCHLORIDE 5 MG/ML
10 INJECTION, SOLUTION INTRAVENOUS EVERY 10 MIN PRN
Status: DISCONTINUED | OUTPATIENT
Start: 2024-04-11 | End: 2024-04-12

## 2024-04-11 RX ORDER — NALOXONE HYDROCHLORIDE 0.4 MG/ML
80 INJECTION, SOLUTION INTRAMUSCULAR; INTRAVENOUS; SUBCUTANEOUS AS NEEDED
Status: ACTIVE | OUTPATIENT
Start: 2024-04-11 | End: 2024-04-12

## 2024-04-11 RX ORDER — LABETALOL HYDROCHLORIDE 5 MG/ML
5 INJECTION, SOLUTION INTRAVENOUS EVERY 5 MIN PRN
Status: ACTIVE | OUTPATIENT
Start: 2024-04-11 | End: 2024-04-12

## 2024-04-11 RX ORDER — VERAPAMIL HYDROCHLORIDE 2.5 MG/ML
INJECTION, SOLUTION INTRAVENOUS
Status: COMPLETED
Start: 2024-04-11 | End: 2024-04-11

## 2024-04-11 RX ORDER — SODIUM CHLORIDE, SODIUM LACTATE, POTASSIUM CHLORIDE, CALCIUM CHLORIDE 600; 310; 30; 20 MG/100ML; MG/100ML; MG/100ML; MG/100ML
INJECTION, SOLUTION INTRAVENOUS CONTINUOUS
Status: DISCONTINUED | OUTPATIENT
Start: 2024-04-11 | End: 2024-04-12

## 2024-04-11 RX ORDER — LIDOCAINE HYDROCHLORIDE 10 MG/ML
INJECTION, SOLUTION INFILTRATION; PERINEURAL
Status: COMPLETED
Start: 2024-04-11 | End: 2024-04-11

## 2024-04-11 RX ORDER — HYDROMORPHONE HYDROCHLORIDE 1 MG/ML
0.2 INJECTION, SOLUTION INTRAMUSCULAR; INTRAVENOUS; SUBCUTANEOUS EVERY 5 MIN PRN
Status: ACTIVE | OUTPATIENT
Start: 2024-04-11 | End: 2024-04-12

## 2024-04-11 RX ORDER — SODIUM CHLORIDE 9 MG/ML
INJECTION, SOLUTION INTRAVENOUS CONTINUOUS PRN
Status: DISCONTINUED | OUTPATIENT
Start: 2024-04-11 | End: 2024-04-11 | Stop reason: SURG

## 2024-04-11 RX ORDER — ACETAMINOPHEN 325 MG/1
650 TABLET ORAL EVERY 4 HOURS PRN
Status: DISCONTINUED | OUTPATIENT
Start: 2024-04-11 | End: 2024-04-12

## 2024-04-11 RX ORDER — MIDAZOLAM HYDROCHLORIDE 1 MG/ML
1 INJECTION INTRAMUSCULAR; INTRAVENOUS EVERY 5 MIN PRN
Status: ACTIVE | OUTPATIENT
Start: 2024-04-11 | End: 2024-04-12

## 2024-04-11 RX ORDER — IODIXANOL 320 MG/ML
350 INJECTION, SOLUTION INTRAVASCULAR
Status: COMPLETED | OUTPATIENT
Start: 2024-04-11 | End: 2024-04-11

## 2024-04-11 RX ORDER — ONDANSETRON 2 MG/ML
4 INJECTION INTRAMUSCULAR; INTRAVENOUS EVERY 6 HOURS PRN
Status: DISCONTINUED | OUTPATIENT
Start: 2024-04-11 | End: 2024-04-11

## 2024-04-11 RX ORDER — PROCHLORPERAZINE EDISYLATE 5 MG/ML
5 INJECTION INTRAMUSCULAR; INTRAVENOUS EVERY 8 HOURS PRN
Status: DISCONTINUED | OUTPATIENT
Start: 2024-04-11 | End: 2024-04-11

## 2024-04-11 RX ORDER — HYDROCODONE BITARTRATE AND ACETAMINOPHEN 10; 325 MG/1; MG/1
1 TABLET ORAL ONCE AS NEEDED
Status: ACTIVE | OUTPATIENT
Start: 2024-04-11 | End: 2024-04-11

## 2024-04-11 RX ORDER — MEPERIDINE HYDROCHLORIDE 25 MG/ML
12.5 INJECTION INTRAMUSCULAR; INTRAVENOUS; SUBCUTANEOUS AS NEEDED
Status: DISCONTINUED | OUTPATIENT
Start: 2024-04-11 | End: 2024-04-12 | Stop reason: HOSPADM

## 2024-04-11 RX ORDER — DEXAMETHASONE SODIUM PHOSPHATE 4 MG/ML
VIAL (ML) INJECTION AS NEEDED
Status: DISCONTINUED | OUTPATIENT
Start: 2024-04-11 | End: 2024-04-11 | Stop reason: SURG

## 2024-04-11 RX ORDER — HYDROCODONE BITARTRATE AND ACETAMINOPHEN 10; 325 MG/1; MG/1
2 TABLET ORAL ONCE AS NEEDED
Status: ACTIVE | OUTPATIENT
Start: 2024-04-11 | End: 2024-04-11

## 2024-04-11 RX ORDER — HYDROMORPHONE HYDROCHLORIDE 1 MG/ML
0.4 INJECTION, SOLUTION INTRAMUSCULAR; INTRAVENOUS; SUBCUTANEOUS EVERY 5 MIN PRN
Status: ACTIVE | OUTPATIENT
Start: 2024-04-11 | End: 2024-04-12

## 2024-04-11 RX ORDER — NITROGLYCERIN 20 MG/100ML
INJECTION INTRAVENOUS
Status: COMPLETED
Start: 2024-04-11 | End: 2024-04-11

## 2024-04-11 RX ORDER — ACETAMINOPHEN 500 MG
1000 TABLET ORAL ONCE AS NEEDED
Status: ACTIVE | OUTPATIENT
Start: 2024-04-11 | End: 2024-04-11

## 2024-04-11 RX ORDER — HEPARIN SODIUM 1000 [USP'U]/ML
INJECTION, SOLUTION INTRAVENOUS; SUBCUTANEOUS
Status: COMPLETED
Start: 2024-04-11 | End: 2024-04-11

## 2024-04-11 RX ORDER — ACETAMINOPHEN 650 MG/1
650 SUPPOSITORY RECTAL EVERY 4 HOURS PRN
Status: DISCONTINUED | OUTPATIENT
Start: 2024-04-11 | End: 2024-04-12

## 2024-04-11 RX ORDER — HEPARIN SODIUM 1000 [USP'U]/ML
INJECTION, SOLUTION INTRAVENOUS; SUBCUTANEOUS AS NEEDED
Status: DISCONTINUED | OUTPATIENT
Start: 2024-04-11 | End: 2024-04-11 | Stop reason: SURG

## 2024-04-11 RX ORDER — HYDROMORPHONE HYDROCHLORIDE 1 MG/ML
0.6 INJECTION, SOLUTION INTRAMUSCULAR; INTRAVENOUS; SUBCUTANEOUS EVERY 5 MIN PRN
Status: ACTIVE | OUTPATIENT
Start: 2024-04-11 | End: 2024-04-12

## 2024-04-11 RX ORDER — CEFAZOLIN SODIUM/WATER 2 G/20 ML
SYRINGE (ML) INTRAVENOUS AS NEEDED
Status: DISCONTINUED | OUTPATIENT
Start: 2024-04-11 | End: 2024-04-11 | Stop reason: SURG

## 2024-04-11 RX ORDER — SODIUM CHLORIDE, SODIUM LACTATE, POTASSIUM CHLORIDE, CALCIUM CHLORIDE 600; 310; 30; 20 MG/100ML; MG/100ML; MG/100ML; MG/100ML
INJECTION, SOLUTION INTRAVENOUS CONTINUOUS
Status: ACTIVE | OUTPATIENT
Start: 2024-04-11 | End: 2024-04-12

## 2024-04-11 RX ORDER — ONDANSETRON 2 MG/ML
4 INJECTION INTRAMUSCULAR; INTRAVENOUS EVERY 6 HOURS PRN
Status: DISCONTINUED | OUTPATIENT
Start: 2024-04-11 | End: 2024-04-12

## 2024-04-11 RX ADMIN — DEXAMETHASONE SODIUM PHOSPHATE 8 MG: 4 MG/ML VIAL (ML) INJECTION at 10:42:00

## 2024-04-11 RX ADMIN — CEFAZOLIN SODIUM/WATER 2 G: 2 G/20 ML SYRINGE (ML) INTRAVENOUS at 11:08:00

## 2024-04-11 RX ADMIN — IODIXANOL 175 ML: 320 INJECTION, SOLUTION INTRAVASCULAR at 16:47:00

## 2024-04-11 RX ADMIN — SODIUM CHLORIDE: 9 INJECTION, SOLUTION INTRAVENOUS at 08:59:00

## 2024-04-11 RX ADMIN — ROCURONIUM BROMIDE 50 MG: 10 INJECTION, SOLUTION INTRAVENOUS at 10:42:00

## 2024-04-11 RX ADMIN — ONDANSETRON 4 MG: 2 INJECTION INTRAMUSCULAR; INTRAVENOUS at 16:52:00

## 2024-04-11 RX ADMIN — LIDOCAINE AND PRILOCAINE: 25; 25 CREAM TOPICAL at 07:50:00

## 2024-04-11 RX ADMIN — HEPARIN SODIUM 1000 UNITS: 1000 INJECTION, SOLUTION INTRAVENOUS; SUBCUTANEOUS at 11:53:00

## 2024-04-11 RX ADMIN — CEFAZOLIN SODIUM/WATER 2 G: 2 G/20 ML SYRINGE (ML) INTRAVENOUS at 15:08:00

## 2024-04-11 RX ADMIN — ROCURONIUM BROMIDE 20 MG: 10 INJECTION, SOLUTION INTRAVENOUS at 11:59:00

## 2024-04-11 RX ADMIN — HEPARIN SODIUM 1000 UNITS: 1000 INJECTION, SOLUTION INTRAVENOUS; SUBCUTANEOUS at 14:13:00

## 2024-04-11 RX ADMIN — SODIUM CHLORIDE, SODIUM LACTATE, POTASSIUM CHLORIDE, CALCIUM CHLORIDE: 600; 310; 30; 20 INJECTION, SOLUTION INTRAVENOUS at 20:34:00

## 2024-04-11 NOTE — CONSULTS
[facility]  Neurocritical Care Consult Note    Mark Guzman Patient Status:  Inpatient    1995 MRN [id number]   Location [facility] 6NE-A Attending Jose Maria Toavr MD, PhD   Hosp Day # 0 PCP Porfirio Roca MD       Reason for Consultation:   Postop icu monitoring    HPI:   Patient is a 29 year old male with a h/o h/o autism c/b ADHD and anxiety do, and known R fronto-parietal scalp AVM s/p stage I percutaneous NBCA embolization , stage II embolization , stage III embolization , stage IV trans-arterial ethanol embolization , stage V trans-arterial ethanol embolization Dec '23, now s/p stage VI embolization treatment  and pt was transferred to cnicu for further monitoring.     Past Medical History:    ADHD    Anxiety    Autism (HCC)    Autism spectrum disorder (HCC)       No past surgical history on file.    Medications Prior to Admission   Medication Sig Dispense Refill Last Dose    PREDNISONE 10 MG Oral Tab Patient to take 50 mg 13 hrs prior to exam, 50 mg 7 hours prior to exam and 50 mg 1 hr prior to exam.  Take dose one on .24 and two subsequent doses on .24. 15 tablet  2024    DIPHENHYDRAMINE 50 MG Oral Cap Take 1 capsule (50 mg total) by mouth As Directed for 1 dose. Take diphenhydramine 50 mg with 3rd prednisone 1 hour before exam/surgery. Do not drive. 1 capsule 0 2024    sertraline 25 MG Oral Tab    4/10/2024    Multiple Vitamins-Minerals (MULTI-VITAMIN/MINERALS) Oral Tab Take 1 tablet by mouth daily.   Past Month    Amphetamine-Dextroamphet ER 10 MG Oral Capsule SR 24 Hr Take 1 capsule (10 mg total) by mouth every morning.   4/10/2024    Sertraline HCl 50 MG Oral Tab Take 1.5 tablets (75 mg total) by mouth daily.   4/10/2024    Docusate Sodium (COLACE OR) Take by mouth daily.       Clindamycin Phos-Benzoyl Perox (BENZACLIN WITH PUMP) 1-5 % External Gel 1 Application As Directed.        Allergies   Allergen  Reactions    Radiology Contrast Iodinated Dyes SWELLING     Social History     Socioeconomic History    Marital status: Single   Tobacco Use    Smoking status: Never    Smokeless tobacco: Never   Vaping Use    Vaping status: Never Used   Substance and Sexual Activity    Alcohol use: Never    Drug use: Never     Family History   Problem Relation Age of Onset    Stroke Maternal Grandmother     Colon Cancer Maternal Grandfather     Diabetes Maternal Grandfather     Cancer Paternal Grandfather         pancreatic       Current Meds:  Current Facility-Administered Medications   Medication Dose Route Frequency    lactated ringers infusion   Intravenous Continuous    lactated ringers IV bolus 500 mL  500 mL Intravenous Once PRN    acetaminophen (Tylenol Extra Strength) tab 1,000 mg  1,000 mg Oral Once PRN    Or    HYDROcodone-acetaminophen (Norco)  MG per tab 1 tablet  1 tablet Oral Once PRN    Or    HYDROcodone-acetaminophen (Norco)  MG per tab 2 tablet  2 tablet Oral Once PRN    labetalol (Trandate) 5 mg/mL injection 5 mg  5 mg Intravenous Q5 Min PRN    naloxone (Narcan) 0.4 MG/ML injection 80 mcg  80 mcg Intravenous PRN    midazolam (Versed) 2 MG/2ML injection 1 mg  1 mg Intravenous Q5 Min PRN    meperidine (Demerol) 25 MG/ML injection 12.5 mg  12.5 mg Intravenous PRN    fentaNYL (Sublimaze) 50 mcg/mL injection 25 mcg  25 mcg Intravenous Q5 Min PRN    Or    fentaNYL (Sublimaze) 50 mcg/mL injection 50 mcg  50 mcg Intravenous Q5 Min PRN    HYDROmorphone (Dilaudid) 1 MG/ML injection 0.2 mg  0.2 mg Intravenous Q5 Min PRN    Or    HYDROmorphone (Dilaudid) 1 MG/ML injection 0.4 mg  0.4 mg Intravenous Q5 Min PRN    Or    HYDROmorphone (Dilaudid) 1 MG/ML injection 0.6 mg  0.6 mg Intravenous Q5 Min PRN    ethyl alcohol (Ablysinol) 99% injection  10 mL Intraneural Once    ethyl alcohol (Ablysinol) 99% injection  10 mL Intraneural Once    ethyl alcohol (Ablysinol) 99% injection  10 mL Intraneural Once    acetaminophen  (Tylenol) tab 650 mg  650 mg Oral Q4H PRN    Or    acetaminophen (Tylenol) rectal suppository 650 mg  650 mg Rectal Q4H PRN    morphINE PF 2 MG/ML injection 1 mg  1 mg Intravenous Q2H PRN    Or    morphINE PF 2 MG/ML injection 2 mg  2 mg Intravenous Q2H PRN    ondansetron (Zofran) 4 MG/2ML injection 4 mg  4 mg Intravenous Q6H PRN    prochlorperazine (Compazine) 10 MG/2ML injection 5 mg  5 mg Intravenous Q8H PRN    labetalol (Trandate) 5 mg/mL injection 10 mg  10 mg Intravenous Q10 Min PRN    niCARdipine in sodium chloride 0.86% (carDENE) 20 mg/200mL infusion premix  5-15 mg/hr Intravenous Continuous PRN       Review of Systems:     Constitutional:    Denies unusual weight loss or weight gain, fever/chills or night sweats.  HEENT:                Denies changes in vision or difficulty swallowing.  Pulm:                    Denies dyspnea, cough, or sputum production  Cardiac:               Denies chest pain, palpitations or lower extremity edema.  GI:                         Denies constipation, heartburn or melena.  :                       Denies dysuria or hematuria.  Skin:                     Denies rashes or open areas.  Neuro:                  As per HPI    All other systems were reviewed and are negative.    Vitals:   Temp:  [97.8 °F (36.6 °C)-98.1 °F (36.7 °C)] 97.8 °F (36.6 °C)  Pulse:  [] 105  Resp:  [13-25] 17  BP: (108-133)/(65-80) 121/80  SpO2:  [95 %-96 %] 95 %  AO: (123-159)/(68-87) 159/87  There is no height or weight on file to calculate BMI.    Intake/Output:    Intake/Output Summary (Last 24 hours) at 4/11/2024 1843  Last data filed at 4/11/2024 1715  Gross per 24 hour   Intake 4400 ml   Output 1700 ml   Net 2700 ml       Physical Examination:   General- No acute distress  CV- RRR  Resp- CTA Bilat  Neuro-  Mental status- awake and alert, regards and follows commands, oriented x3, speech fluent  Cranial nerves- pupils equally round and reactive to light, extraocular muscles intact, face  symmetric, visual fields full  Motor- 5/5 throughout  Sens-  Intact to light touch    Diagnostics:   No results found.    Lab Review   No results for input(s): \"NA\", \"K\", \"CL\", \"CO2\", \"GLU\", \"BUN\", \"CREATSERUM\" in the last 168 hours.  No results for input(s): \"WBC\", \"HGB\", \"PLT\" in the last 168 hours.    Assesment/Plan:     Neuro:  R fronto-parietal scalp AVM- now s/p stage VI ethanol embolization.  Postop per DAI.  Cont neurochecks/pt/ot/st.  H/o autism/ADHD  Cardiac:  sbp goal 100-150  Pulmonary:  Stable on RA  Renal:  IVFs, monitor BUN/Cr  GI:  PO as tolerated  Heme/ID:  Afebrile, no leukocytosis  Endocrine/Rheum:  Monitor glucose, sliding scale insulin prn  DVT Prophylaxis:  SCD’s    Goals of the Day: neurochecks   A total of 45 minutes of critical care time (exclusive of billable procedures) was administered to manage and/or prevent neurologic instability. This involved direct patient intervention, complex decision making, and/or extensive discussions with the patient, family, and clinical staff.    Thank you for allowing me to participate in the care of this patient.     Callie Monzon MD, [facility]  Medical Director of System Neurosciences  Chief, Section of Neurocritical Care  [facility]

## 2024-04-11 NOTE — ANESTHESIA PROCEDURE NOTES
Arterial Line    Date/Time: 4/11/2024 10:43 AM    Performed by: Rui Perez MD  Authorized by: Rui Perez MD    General Information and Staff    Procedure Start:  4/11/2024 10:43 AM  Procedure End:  4/11/2024 10:46 AM  Anesthesiologist:  Rui Perez MD  Performed By:  Anesthesiologist  Patient Location:  OR  Indication: continuous blood pressure monitoring and blood sampling needed    Site Identification: surface landmarks    Preanesthetic Checklist: 2 patient identifiers, IV checked, risks and benefits discussed, monitors and equipment checked, pre-op evaluation, timeout performed, anesthesia consent and sterile technique used    Procedure Details    Catheter Size:  20 G  Catheter Length:  1 and 3/4 inch  Catheter Type:  Arrow  Seldinger Technique?: Yes    Laterality:  Left  Site:  Radial artery  Site Prep: chlorhexidine    Line Secured:  Wrist Brace, tape and Tegaderm    Assessment    Events: patient tolerated procedure well with no complications      Medications  4/11/2024 10:43 AM      Additional Comments

## 2024-04-11 NOTE — ANESTHESIA POSTPROCEDURE EVALUATION
[facility]    Mark Guzman Patient Status:  Inpatient   Age/Gender 29 year old male MRN [id number]   Location [facility] 6NE-A Attending Jose Maria Tovar MD, PhD   Hosp Day # 0 PCP Porfirio Roca MD       Anesthesia Post-op Note        Procedure Summary       Date: 04/11/24 Room / Location: [facility] Interventional Suites    Anesthesia Start: 0858 Anesthesia Stop: 1715    Procedure: IVS NEURO Diagnosis:       AVF (arteriovenous fistula) (HCC)      AVM (arteriovenous malformation) (HCC)      AVF (arteriovenous fistula) (HCC)      AVM (arteriovenous malformation) (HCC)    Scheduled Providers: Rui Perez MD Anesthesiologist: Rui Perez MD    Anesthesia Type: general ASA Status: 3            Anesthesia Type: general    Vitals Value Taken Time   /68 04/11/24 1715   Temp 97.8 °F (36.6 °C) 04/11/24 1715   Pulse 107 04/11/24 1715   Resp 21 04/11/24 1715   SpO2 96 % 04/11/24 1715   Vitals shown include unfiled device data.    Patient Location: PACU    Anesthesia Type: general    Airway Patency: patent and extubated    Postop Pain Control: adequate    Mental Status: mildly sedated but able to meaningfully participate in the post-anesthesia evaluation    Nausea/Vomiting: none    Cardiopulmonary/Hydration status: stable euvolemic    Complications: no apparent anesthesia related complications    Postop vital signs: stable    Dental Exam: Unchanged from Preop    Sign out given to ICU staff.

## 2024-04-11 NOTE — ANESTHESIA PROCEDURE NOTES
Airway  Date/Time: 4/11/2024 10:44 AM  Urgency: elective    Airway not difficult    General Information and Staff    Patient location during procedure: OR  Anesthesiologist: Rui Perez MD  Performed: anesthesiologist   Performed by: Rui Perez MD  Authorized by: Rui Perez MD      Indications and Patient Condition  Indications for airway management: anesthesia  Sedation level: deep  Preoxygenated: yes  Patient position: sniffing  Mask difficulty assessment: 1 - vent by mask    Final Airway Details  Final airway type: endotracheal airway      Successful airway: ETT  Cuffed: yes   Successful intubation technique: direct laryngoscopy  Endotracheal tube insertion site: oral  Blade: Radha  Blade size: #4  ETT size (mm): 7.5    Cormack-Lehane Classification: grade I - full view of glottis  Placement verified by: capnometry   Measured from: lips  ETT to lips (cm): 22  Number of attempts at approach: 1

## 2024-04-11 NOTE — H&P
History & Physical Examination    Patient Name: Mark Guzman  MRN: [id number]  CSN: [id number]  YOB: 1995    Diagnosis: right frontoparietal scalp AVM/AVF     Present Illness: Unruly Guzman is a 28 yo male with a right frontal AVM s/p 5 embolization procedures with Schneider. He presents today with for a cerebral angiogram and possible embolization treatment.    Has been NPO MN  Does not take ASA, Plavix, or other anticoagulants.  Denies recent illness such as fevers or GI symptoms.  Labs from 4/2/24 reviewed: BMP, CBC, PT/INR all WNL.    No current facility-administered medications for this encounter.       Allergies:   Allergies   Allergen Reactions    Radiology Contrast Iodinated Dyes SWELLING       Past Medical History:    ADHD    Anxiety    Autism (HCC)    Autism spectrum disorder (HCC)     No past surgical history on file.  Family History   Problem Relation Age of Onset    Stroke Maternal Grandmother     Colon Cancer Maternal Grandfather     Diabetes Maternal Grandfather     Cancer Paternal Grandfather         pancreatic     Social History     Tobacco Use    Smoking status: Never    Smokeless tobacco: Never   Substance Use Topics    Alcohol use: Never       SYSTEM Check if Review is Normal Check if Physical Exam is Normal If not normal, please explain:   HEENT [X] [X]    NECK & BACK [X] [X]    HEART [X] [X]    LUNGS [X] [X]    ABDOMEN [X] [X]    UROGENITAL [ ] [ ] deferred   EXTREMITIES [X] [X]    Pedal Pulses X X      Neurological Exam:  Mental status: Oriented to person, place, and time   Speech: Fluent, no dysarthria  CN: II-XII grossly intact  Memory and comprehension: Intact   Motor: No drift, no focal arm or leg weakness.   Sensory: Intact to light touch    [ x ] I have discussed the risks and benefits and alternatives with the patient/family.  They understand and agree to proceed with plan of care.  [ x ] I have reviewed the History and Physical done within the last 30 days.  Any  changes noted above.    Lou Ogden, APRN  4/11/2024, 7:41 AM  [facility] [id number]

## 2024-04-12 VITALS
HEART RATE: 95 BPM | BODY MASS INDEX: 28 KG/M2 | WEIGHT: 165.81 LBS | RESPIRATION RATE: 22 BRPM | DIASTOLIC BLOOD PRESSURE: 69 MMHG | OXYGEN SATURATION: 98 % | TEMPERATURE: 99 F | SYSTOLIC BLOOD PRESSURE: 111 MMHG

## 2024-04-12 LAB
ANION GAP SERPL CALC-SCNC: 8 MMOL/L (ref 0–18)
BUN BLD-MCNC: 12 MG/DL (ref 9–23)
CALCIUM BLD-MCNC: 8.1 MG/DL (ref 8.5–10.1)
CHLORIDE SERPL-SCNC: 112 MMOL/L (ref 98–112)
CO2 SERPL-SCNC: 23 MMOL/L (ref 21–32)
CREAT BLD-MCNC: 0.97 MG/DL
EGFRCR SERPLBLD CKD-EPI 2021: 108 ML/MIN/1.73M2 (ref 60–?)
ERYTHROCYTE [DISTWIDTH] IN BLOOD BY AUTOMATED COUNT: 13.3 %
GLUCOSE BLD-MCNC: 115 MG/DL (ref 70–99)
HCT VFR BLD AUTO: 37.2 %
HGB BLD-MCNC: 12.7 G/DL
MCH RBC QN AUTO: 29.3 PG (ref 26–34)
MCHC RBC AUTO-ENTMCNC: 34.1 G/DL (ref 31–37)
MCV RBC AUTO: 85.9 FL
OSMOLALITY SERPL CALC.SUM OF ELEC: 297 MOSM/KG (ref 275–295)
PLATELET # BLD AUTO: 242 10(3)UL (ref 150–450)
POTASSIUM SERPL-SCNC: 3.4 MMOL/L (ref 3.5–5.1)
RBC # BLD AUTO: 4.33 X10(6)UL
SODIUM SERPL-SCNC: 143 MMOL/L (ref 136–145)
WBC # BLD AUTO: 17.4 X10(3) UL (ref 4–11)

## 2024-04-12 PROCEDURE — 99291 CRITICAL CARE FIRST HOUR: CPT | Performed by: INTERNAL MEDICINE

## 2024-04-12 RX ORDER — POTASSIUM CHLORIDE 20 MEQ/1
40 TABLET, EXTENDED RELEASE ORAL ONCE
Status: COMPLETED | OUTPATIENT
Start: 2024-04-12 | End: 2024-04-12

## 2024-04-12 RX ADMIN — POTASSIUM CHLORIDE 40 MEQ: 20 TABLET, EXTENDED RELEASE ORAL at 05:02:00

## 2024-04-12 NOTE — PLAN OF CARE
Upon care transfer circa 0730, pt was sitting in the chair. Pt was a/o x4. VSS. Pt ate breakfast and ambulated. Activity tolerated well. Per order, pt to be discharged. Plan of care and AVS reviewed with pt and family. Per pt and family, no further questions. Pt discharged to home via parent's vehicle. Pt to follow up with PCP and neurosurgery in coming weeks.

## 2024-04-12 NOTE — PLAN OF CARE
Neurologically unchanged throughout serial assessments.    Pleasant, cooperative, engaging, loquacious.    Mother at bedside initally, then home for night, expect back in early AM

## 2024-04-12 NOTE — DISCHARGE SUMMARY
[facility]  DISCHARGE SUMMARY    Mark Guzman Patient Status:  Inpatient    1995 MRN [id number]   Location [facility] 6NE-A Attending Jose Maria Tovar MD, PhD   Hosp Day # 1 PCP Porfirio Roca MD     Date of Admission: 2024    Date of Discharge: 24      ADMISSION DIAGNOSIS:   AVF (arteriovenous fistula) (HCC) [I77.0]  AVM (arteriovenous malformation) (HCC) [Q27.30]      DISCHARGE DIAGNOSIS:   Patient Active Problem List   Diagnosis    Attention deficit hyperactivity disorder (ADHD), unspecified ADHD type    Conversion disorder    Generalized anxiety disorder    Autism (HCC)    AVM (arteriovenous malformation) (HCC)    Acute left ankle pain    Other acne    AVF (arteriovenous fistula) (HCC)    Leukocytosis    Hypophosphatemia    Sinus tachycardia    * No post-op diagnosis entered *      REASON FOR ADMISSION: Planned surgical procedure      PROCEDURES:  IA ETOH embolization      HISTORY OF PRESENT ILLNESS:   Unruly Guzman is a 28 yo male with a right frontal AVM s/p 5 embolization procedures with [place]. He presents for a cerebral angiogram and possible embolization treatment.       HOSPITAL COURSE:   This patient presented for the above surgical procedure as planned. Patient underwent surgical procedure without complication and was admitted to the Neuro Intensive Care Unit post-operatively. Patient demonstrated improvement of pre-operative symptoms, as well as increased mobility and adequate pain control during the hospital course.  Patient was evaluated by ancillary services and was recommended to discharge to home. Patient was cleared for discharge by all disciplines on 24, with follow-up appointments scheduled.         COMPLICATIONS: None      DISCHARGE CONDITION: Good      DISPOSITION: Home      DISCHARGE MEDICATIONS:   Current Discharge Medication List        CONTINUE these medications which have NOT CHANGED    Details   Docusate Sodium (COLACE OR) Take by mouth daily.       !! sertraline 25 MG Oral Tab       Multiple Vitamins-Minerals (MULTI-VITAMIN/MINERALS) Oral Tab Take 1 tablet by mouth daily.      Amphetamine-Dextroamphet ER 10 MG Oral Capsule SR 24 Hr Take 1 capsule (10 mg total) by mouth every morning.      !! Sertraline HCl 50 MG Oral Tab Take 1.5 tablets (75 mg total) by mouth daily.      Clindamycin Phos-Benzoyl Perox (BENZACLIN WITH PUMP) 1-5 % External Gel 1 Application As Directed.       !! - Potential duplicate medications found. Please discuss with provider.        STOP taking these medications       PREDNISONE 10 MG Oral Tab        DIPHENHYDRAMINE 50 MG Oral Cap              FOLLOW UP VISITS: No future appointments. Follow up with Dr. Tovar in 1 month.        PATIENT INSTRUCTIONS:   The patient was instructed to avoid heavy lifting, no more than 10 pounds for 1 week. Monitor for signs/ symptoms of site infection. Call the office with any concerns of pain or infection. The patient was instructed if there is any bleeding at the access site to apply pressure for 20 minutes. If bleeding does not stop to call 911.         BAR Hillman   [facility]  4/12/2024 9:10 AM

## 2024-04-12 NOTE — DISCHARGE INSTRUCTIONS
POST NEURO ENDOVASCULAR HOME CARE INSTRUCTIONS    Activity:   DO NOT drive within the first 24 hours after the procedure. You may resume driving late the following day according to the nurse or physician's instructions   Plan on resting and relaxing today and tomorrow  Resume your normal activity after 48 hours, or as instructed by your physician   Do not lift anything over 10 pounds for the next 7 days   Avoid sexual activity for the next 24 hours   Avoid drinking alcohol for the next 24 hours   If the groin site was used, avoid repeated stair use and excessive walking for the next 24 hours   If the wrist was used, avoid bending/flexing of the wrist for the next 24 hours  What is Normal:   A small lump (roughly 2-3 cm) at the procedure site associated with mild tenderness when touched that may last up to a few weeks, but should progressively improve  The procedure site may be bruised or discolored   There may be a small amount of drainage on the bandage   Special Instructions:   Drink plenty of fluids during the next 24 hours to “flush” the contrast from your system   The bandage is to remain in place for 24 hours   Keep the bandage clean and dry   DO NOT submerge the procedure site for 7 days (no bath tubs or pools)   This includes dishwashing/submersion of the wrist, if the wrist was used   After 24 hours, you must remove the bandage   Ok to shower and wash the procedure site gently with soap and water   Wearing a bandage is not necessary. If you choose to wear a bandage for a few days, make sure it remains clean and dry and that it is changed daily      (Post Neuro Endovascular Home Care Instructions continued)    When you should NOTIFY YOUR PHYSICIAN:   Bleeding can occur at the procedure site - both on the outside of the skin and/or beneath the surface of the skin   Swelling or a large lump at the procedure site can occur, which may be accompanied by moderate to severe pain   If either of the above occurs,  lie down flat. Have someone apply pressure to the procedure site with both hands, as instructed by the nurse. Hold pressure for 20 minutes and the bleeding should stop. Notify your physician of the occurrence   If the bleeding does not stop, call 911 and continue to apply pressure   If you experience signs of a fever, temperature >101°F, chills, infection (redness, swelling, thick yellow drainage, or a foul odor from the procedure site)   If you notice any numbness, tingling, or loss of feeling to your leg or foot for groin access   If you notice any numbness, tingling, or loss of feeling to your fingers or hand, if wrist access was utilized  If You Received a Stent:   You will remain on an antiplatelet drug and/or aspirin. Antiplatelet medications are usually taken for six months to one year and should not be stopped unless your interventional neuroradiologist directs you to do so.   These medications help to prevent blockage at the stent site. If another physician or dentist asks you to stop your antiplatelet medication, you need to consult your interventional neuroradiologist first so they can discuss together, the risks that may be involved if you are not taking the antiplatelet medication.  Other:   Cerebral Coils and stents are compatible for MRI/ MRA imaging  You may resume your present diet, unless otherwise specified by your physician   You may resume all of your medications as prescribed, unless otherwise directed by your physician. A list of your medications was provided to you at discharge  Follow up in clinic as directed.  You should be seen in clinic either by the providing physician or nurse practitioner within 2-4 weeks following your procedure

## 2024-04-12 NOTE — CONSULTS
[place] Hospitalist H&P/Consult note       CC: post op medical management   Asked to see pt by Dr Tovar     PCP: Porfirio Roca MD    History of Present Illness: Patient is a 29 year old male with PMH sig for adha, anxiety, autism, frontoparietal scalp AVM sp staged embolizations, here for stage 6 of embolization   Feels well, no pain, no nv/. Ambulating     PMH  Past Medical History:    ADHD    Anxiety    Autism (HCC)    Autism spectrum disorder (HCC)        PSH  Stage scalp embolization     ALL:  Allergies   Allergen Reactions    Radiology Contrast Iodinated Dyes SWELLING        Home Medications:  Outpatient Medications Marked as Taking for the 4/11/24 encounter (Hospital Encounter) with  [facility]   Medication Sig Dispense Refill    PREDNISONE 10 MG Oral Tab Patient to take 50 mg 13 hrs prior to exam, 50 mg 7 hours prior to exam and 50 mg 1 hr prior to exam.  Take dose one on 4.7.24 and two subsequent doses on 4.8.24. 15 tablet     DIPHENHYDRAMINE 50 MG Oral Cap Take 1 capsule (50 mg total) by mouth As Directed for 1 dose. Take diphenhydramine 50 mg with 3rd prednisone 1 hour before exam/surgery. Do not drive. 1 capsule 0    Docusate Sodium (COLACE OR) Take by mouth daily.      sertraline 25 MG Oral Tab       Multiple Vitamins-Minerals (MULTI-VITAMIN/MINERALS) Oral Tab Take 1 tablet by mouth daily.      Amphetamine-Dextroamphet ER 10 MG Oral Capsule SR 24 Hr Take 1 capsule (10 mg total) by mouth every morning.      Sertraline HCl 50 MG Oral Tab Take 1.5 tablets (75 mg total) by mouth daily.           Soc Hx  Social History     Tobacco Use    Smoking status: Never    Smokeless tobacco: Never   Substance Use Topics    Alcohol use: Never        Fam Hx  Family History   Problem Relation Age of Onset    Stroke Maternal Grandmother     Colon Cancer Maternal Grandfather     Diabetes Maternal Grandfather     Cancer Paternal Grandfather         pancreatic       Review of Systems  Comprehensive ROS reviewed and  negative except for what's stated above.         OBJECTIVE:  /69   Pulse 95   Temp 99.2 °F (37.3 °C) (Temporal)   Resp 22   Wt 165 lb 12.6 oz (75.2 kg)   SpO2 98%   BMI 27.59 kg/m²   General:  Alert, no distress, appears stated age.   Head:  Normocephalic, without obvious abnormality, atraumatic.   Eyes:  Sclera anicteric, No conjunctival pallor, EOMs intact.    Throat: dry   Neck: Supple    Lungs:   Clear to auscultation bilaterally. Normal effort   Chest wall:  No tenderness or deformity.   Heart:  Regular rate and rhythm    Abdomen:   Soft, NT/ND    Extremities: Atraumatic, no cyanosis or edema.   Skin: No visible rashes or lesions.    Neurologic: Normal strength, no focal deficit appreciated     Diagnostic Data:    CBC/Chem  Recent Labs   Lab 04/12/24  0409   WBC 17.4*   HGB 12.7*   MCV 85.9   .0       Recent Labs   Lab 04/12/24  0409      K 3.4*      CO2 23.0   BUN 12   CREATSERUM 0.97   *   CA 8.1*       No results for input(s): \"ALT\", \"AST\", \"ALB\", \"AMYLASE\", \"LIPASE\", \"LDH\" in the last 168 hours.    Invalid input(s): \"ALPHOS\", \"TBIL\", \"DBIL\", \"TPROT\"    No results for input(s): \"TROP\" in the last 168 hours.         ASSESSMENT / PLAN:     Patient is a 29 year old male with PMH sig for adha, anxiety, autism, frontoparietal scalp AVM sp staged embolizations, here for stage 6 of embolization     Impression    -R fronto-parietal scalp AVM sp staged embolization   -autism  -adhd       Plan    -post op surgical management per nsg  -resume home sertraline  -ok to dc if cleared by other services       Further recommendations pending patient's clinical course. [place] hospitalist to continue to follow patient while in house    Patient and/or patient's family given opportunity to ask questions and note understanding and agreeing with therapeutic plan as outlined    Francisco Magana Hospitalist  [phone]  Answering Service: [phone]

## 2024-04-12 NOTE — PROGRESS NOTES
[facility]  Neurocritical Care Progress Note     Mark Guzman Patient Status:  Inpatient    1995 MRN [id number]   Location [facility] 6NE-A Attending Jose Maria Tovar MD, PhD   Hosp Day # 1 PCP Porfirio Roca MD     Subjective:   Patient is a 29 year old male h/o autism c/b ADHD and anxiety do, and known R fronto-parietal scalp AVM s/p stage I percutaneous NBCA embolization , stage II embolization , stage III embolization , stage IV trans-arterial ethanol embolization , stage V trans-arterial ethanol embolization Dec '23, now s/p stage VI ethanol embolization treatment      No acute events overnight.    Vitals:   Temp:  [97.8 °F (36.6 °C)-99.2 °F (37.3 °C)] 99.2 °F (37.3 °C)  Pulse:  [] 95  Resp:  [12-26] 22  BP: (103-133)/(57-82) 111/69  SpO2:  [94 %-100 %] 98 %  AO: (105-159)/(60-87) 105/60  Body mass index is 27.59 kg/m².    Intake/Output:    Intake/Output Summary (Last 24 hours) at 2024 0831  Last data filed at 2024 0520  Gross per 24 hour   Intake 5465 ml   Output 2950 ml   Net 2515 ml     Current Meds:  Current Facility-Administered Medications   Medication Dose Route Frequency    sertraline (Zoloft) tab 75 mg  75 mg Oral Daily    meperidine (Demerol) 25 MG/ML injection 12.5 mg  12.5 mg Intravenous PRN    ethyl alcohol (Ablysinol) 99% injection  10 mL Intraneural Once    ethyl alcohol (Ablysinol) 99% injection  10 mL Intraneural Once    ethyl alcohol (Ablysinol) 99% injection  10 mL Intraneural Once    acetaminophen (Tylenol) tab 650 mg  650 mg Oral Q4H PRN    Or    acetaminophen (Tylenol) rectal suppository 650 mg  650 mg Rectal Q4H PRN    morphINE PF 2 MG/ML injection 1 mg  1 mg Intravenous Q2H PRN    Or    morphINE PF 2 MG/ML injection 2 mg  2 mg Intravenous Q2H PRN    prochlorperazine (Compazine) 10 MG/2ML injection 5 mg  5 mg Intravenous Q8H PRN    labetalol (Trandate) 5 mg/mL injection 10 mg  10 mg  Intravenous Q10 Min PRN    niCARdipine in sodium chloride 0.86% (carDENE) 20 mg/200mL infusion premix  5-15 mg/hr Intravenous Continuous PRN    ondansetron (Zofran) 4 MG/2ML injection 4 mg  4 mg Intravenous Q6H PRN     Physical Examination:   General- No acute distress  CV- RRR  Resp- CTA Bilat  Neuro-  Mental status- awake and alert, regards and follows commands, oriented x3, speech fluent  Cranial nerves- pupils equally round and reactive to light, extraocular muscles intact, face symmetric, visual fields full  Motor- 5/5 throughout  Sens-  Intact to light touch    Diagnostics:   No results found.  Lab Review     Recent Labs   Lab 04/12/24  0409      K 3.4*      CO2 23.0   *   BUN 12   CREATSERUM 0.97     Recent Labs   Lab 04/12/24  0409   WBC 17.4*   HGB 12.7*   .0     Assesment/Plan:     Neuro:  R fronto-parietal scalp AVM- now s/p stage VI ethanol embolization.  Postop per DAI.  Cont neurochecks/pt/ot/st.  H/o autism/ADHD  No further neurocritical care needs at this time, further management and outpt follow-up per NeuroIR, will follow peripherally please call with any questions.   Cardiac:  sbp goal 100-150  Pulmonary:  Stable on RA  Renal:  IVFs  GI:  PO as tolerated  Heme/ID:  Afebrile, no leukocytosis  Endocrine/Rheum:  Monitor glucose, sliding scale insulin prn  DVT Prophylaxis:  SCD’s    Goals of the Day: neurochecks   A total of 40 minutes of critical care time (exclusive of billable procedures) was administered to manage and/or prevent neurologic instability. This involved direct patient intervention, complex decision making, and/or extensive discussions with the patient, family, and clinical staff.    Thank you for allowing me to participate in the care of this patient.     Callie Monzon MD, [facility]  Medical Director of System Neurosciences  Chief, Section of Neurocritical Care  [facility]

## 2024-04-12 NOTE — PROCEDURES
[facility]     Interventional Neuroradiology Procedure Note        Procedure: Stage 6 / TRANSARTERIAL ETHANOL EMBOLIZATION OF RIGHT FRONTOPARIETAL SCALP AVM/AVF      Pre-Op Diagnosis:  Right frontal-parietal scalp AVM/AVF      Post-Op Diagnosis: s/p IA EtOH embolization     Surgeon: Jose Maria Tovar MD, PhD     Technique/Findings:   5F Right Radial Sheath   5F Freire glidecatheter  6F Glideslender sheath  6F Benchmark 071     [place]-10 and Headway Duo / Synchro standard .014inch  Scepter 4 x 11 XC balloon microcatheter    IA EtOH embolization of residual AVM/AVF nidal compartments via right STA-anterior branch (14 mL)     IA EtOH embolization using balloon catheter mediated flow arrest in the right STA-middle branch via right STA-anterior branch (2.5 mL) and right STA-posterior branch (2.5 mL) with interval response     Successful EtOH embolization of residual/recurrent compartments of the diffuse filtrating right frontal-parietal scalp AVM/AVF with no significant residual AV shunting or fistulous components     Residual diffuse right frontal-parietal scalp AVM or venous angiopathic changes without AV shunting    Full report to follow        Anesthesia:  General     Complications:  None     Medications:  Ancef 4g IV  Heparin 4000 units IA / 2000 units IV  Verapamil 5mg IA  Nitroglycerin 200 micrograms IA    Blood Loss:  < 50 mL      Assessment/Plan:  Admit NeuroICU / Neurochecks   IVFs  Analgesia and anti-inflammatory NSAIDs or dexamethasone if needed    Immobilize wrist for 2 hours as per TR band deflation protocol   Assess vitals/access site/pulses and neurological status in post-procedure unit  Assess scalp for soft tissue/skin injury /erythema    Discharge home 4/12 if no complications     Jose Maria Tovar MD, PhD  4/11/2024 7:18 PM

## 2024-04-30 ENCOUNTER — PATIENT MESSAGE (OUTPATIENT)
Dept: SURGERY | Facility: CLINIC | Age: 29
End: 2024-04-30

## 2024-04-30 NOTE — TELEPHONE ENCOUNTER
Noted that patient's mother is requesting a call to schedule follow  up after procedure with Dr. Tovar.     Patient underwent procedure with Dr. Tovar on 4.11.24:    Diagnostic Cerebral Angiogram with possible IA ethanol embolization right frontoparietal scalp AVM/AVF     Routed to PSR to arrange appointment.

## 2024-04-30 NOTE — TELEPHONE ENCOUNTER
From: Mark Guzman  To: Jose Maria Tovar  Sent: 4/30/2024 1:04 PM CDT  Subject: Post Op Appointment     Hi -    Please call me to schedule Unruly’s follow up with Dr. Tovar in May.    Thank you,  Oriana Ugalde (mom)  [phone]

## 2024-05-28 ENCOUNTER — TELEPHONE (OUTPATIENT)
Dept: SURGERY | Facility: CLINIC | Age: 29
End: 2024-05-28

## 2024-05-28 ENCOUNTER — OFFICE VISIT (OUTPATIENT)
Dept: SURGERY | Facility: CLINIC | Age: 29
End: 2024-05-28

## 2024-05-28 VITALS
HEART RATE: 60 BPM | DIASTOLIC BLOOD PRESSURE: 62 MMHG | BODY MASS INDEX: 25.83 KG/M2 | WEIGHT: 155 LBS | HEIGHT: 65 IN | SYSTOLIC BLOOD PRESSURE: 106 MMHG

## 2024-05-28 DIAGNOSIS — I77.0 AVF (ARTERIOVENOUS FISTULA) (HCC): ICD-10-CM

## 2024-05-28 DIAGNOSIS — Q27.30 AVM (ARTERIOVENOUS MALFORMATION) (HCC): Primary | ICD-10-CM

## 2024-05-28 PROCEDURE — 99213 OFFICE O/P EST LOW 20 MIN: CPT

## 2024-05-28 NOTE — PROGRESS NOTES
[facility]  Interventional Neuroradiology Clinic Note     Porfirio Roca MD  Primary Care        Date of Service: 5/28/2024     Dear Colleagues,      We had the pleasure of seeing Mark Guzman with past medical history of ADHD, anxiety, autism spectrum disorder presents to the Interventional Neuroradiology Clinic at [facility] for his clinical follow-up evaluation s/p stage 6 IA ethanol embolization of the diffuse infiltrating right frontoparietal scalp AVM/AVF on 4/11/2024. He has undergone multiple stages of embolization with percutaneous transvenous nBCA glue embolization of the draining venous varices, transarterial [place] of the calvarial/dural nidal compartment, and transarterial ethanol embolization of multiple STA and occipital arterial feeders.      Medications:    Docusate Sodium (COLACE OR), Take by mouth daily., Disp: , Rfl:     Clindamycin Phos-Benzoyl Perox (BENZACLIN WITH PUMP) 1-5 % External Gel, 1 Application As Directed., Disp: , Rfl:     sertraline 25 MG Oral Tab, , Disp: , Rfl:     Multiple Vitamins-Minerals (MULTI-VITAMIN/MINERALS) Oral Tab, Take 1 tablet by mouth daily., Disp: , Rfl:     Amphetamine-Dextroamphet ER 10 MG Oral Capsule SR 24 Hr, Take 1 capsule (10 mg total) by mouth every morning., Disp: , Rfl:     Sertraline HCl 50 MG Oral Tab, Take 1.5 tablets (75 mg total) by mouth daily., Disp: , Rfl:      Allergies:   Allergies   Allergen Reactions    Radiology Contrast Iodinated Dyes SWELLING         Physical Exam:  /62 (BP Location: Right arm, Patient Position: Sitting, Cuff Size: adult)   Pulse 60   Ht 65\"   Wt 155 lb (70.3 kg)   BMI 25.79 kg/m²   GENERAL:  The patient was pleasant and cooperative throughout the examination.    HEENT/NEURO:  The patient's head is normocephalic with anicteric sclerae, moist mucous membranes, and nasal cavities. Right frontal-parietal scalp AVM palpable with one large area of elevation noted anteriorly,  resolved in posterior compartment. + Mild persisting skin erythema in focal frontal scalp region. No S/S of skin necrosis present. No drainage/bleeding noted.   The patient is alert, awake, and oriented with normal intelligence, memory, language, and judgement.  On cranial nerve examination, full visual fields are noted by confrontational testing.  The patient's pupils are equally responsive and reactive to light.  Extraocular muscles are intact.  There is no evidence of afferent pupillary defect, anisocoria, myosis, or ptosis.  Facial expression and sensation are symmetric and intact bilaterally in the V1-V3 distributions.  The patient's hearing is grossly intact.  The palate elevates symmetrically and the voice is normal.  Sternocleidomastoid and trapezius muscle strength are intact.  The tongue demonstrates protrusion in the midline.  The patient's strength and sensation are intact in bilateral upper and lower extremities. There is no pronator drift on [place] testing.  Romberg testing is negative.  There is no evidence of nystagmus, ataxia, dysarthria, or dysmetria with finger-to-nose testing.       Assessment/Plan:   Unruly Guzman is a 29 year old male presents status post stage 6 transarterial ethanol embolization of a diffuse infiltrating right frontal-parietal scalp AVM/AVF.  The patient has  tolerated these multiple embolization procedures very well with no significant complications of skin/soft tissue necrosis and remains neurologically intact.  Furthermore, there has been notable angiographic response with involution of the scalp AVM/AVF as well as clinical improvement with resolved pulsatile tinnitus and decreased scalp soft tissue prominence/erythema, though residual lesion and a palpable thrill remains.     We counseled him and his family that complete cure of diffuse head and neck AVM/AVF pathology is not usually possible and requires multiple staged embolization treatments, serial angiographic  monitoring for recurrence/recruitment, and adjunctive multi-disciplinary therapies.  At this point, the patient and his mother (POA) agrees that we have obtained adequate control/response and will defer any further trans-arterial embolization attempts at further involution of the scalp AVM/AVF.  However, percutaneous bleomycin interstitial therapy has been shown to be effective in even high flow vascular pathology and may be an optimum strategy at this stage with a minimally invasive and low risk approach. We also believe that further trans-arterial ethanol embolization can be offered  if there is interval symptomatic or marked angiographic recurrence in the future, but this may be require more aggressive embolization with greater risk to the scalp soft tissues.     We described the procedure, indication, benefits, risk/complications, and alternatives.  The patient and his mother agreed to our plan and will schedule the patient for a follow-up cerebral angiogram followed by possible percutaneous bleomycin treatment in the next few months. The patient and his mother will discuss the options for percutaneous bleomycin therapy for long-term control and make a decision in the interim to schedule his 3-month follow-up cerebral angiogram status post the latest stage of embolization.    Thank you for allowing us to participate in the care of this very interesting patient. Please do not hesitate to contact us with any further questions or concerns.     Sincerely,         Jose Maria Tovar MD, PhD  Department of Radiology, Neurology, and Neurological Surgery  [employer] [facility] [employer]  [facility]    5/28/2024 10:41 AM      I spent approximately 30 minutes reviewing the patient's chart and imaging, with at least half the time spent on counseling the patient on his AVM/AVF pathology and management/treatment plan.

## 2024-05-28 NOTE — TELEPHONE ENCOUNTER
Please place pt reminder for repeat DCA with possible bleomycin treatment with Dr. Tovar for July 2024.     Thank you!

## 2024-05-28 NOTE — PROGRESS NOTES
DAI rooming For established patients  Patient here for f/up transarterial ethanol embolization of right frontal parietal scalp AVM/AVF  Last office visit on 1/9/24  New imaging since last seen: none  Last procedure: as above on 4/11/24  Changes since LOV: none  Anticoagulants: none     Review of Systems:    Hand Dominance: right  General: no symptoms reported  Neuro: no symptoms reported  Head: no symptoms reported  Musculoskeletal: no symptoms reported  Cardiovascular: no symptoms reported  Gastrointestinal: no symptoms reported  Genitourinary: no symptoms reported  Respiratory: no symptoms reported  Eyes: no symptoms reported  Skin: no symptoms reported  Mouth & throat: no symptoms reported  Neck: no symptoms reported  Nose: no symptoms reported  Psychiatric: no symptoms reported     [place]-100  [place] 0

## 2024-07-01 ENCOUNTER — TELEPHONE (OUTPATIENT)
Dept: SURGERY | Facility: CLINIC | Age: 29
End: 2024-07-01

## 2024-07-01 NOTE — TELEPHONE ENCOUNTER
Called pt's mother, Oriana, to schedule procedure.  She stated they would like to proceed with bleomycin treatment.

## 2024-07-03 NOTE — TELEPHONE ENCOUNTER
If pt/mother would like to attempt bleomycin, then this would need to be schedule with anesthesia and an overnight hospital stay.       We would then also need to allow an additional 1 hr- 2 hrs for this procedure. And the medications would need to be placed with the encounter prior to pt's admission.     Please schedule accordingly.     Thank you!

## 2024-07-04 NOTE — TELEPHONE ENCOUNTER
Noted that patient's mother has informed NASREEN that she and patient would like to proceed with scheduling bleomycin treatment in addition to DCA.     Per Dr. Tovar at [facility] on 5/28/24:    \"The patient and his mother agreed to our plan and will schedule the patient for a follow-up cerebral angiogram followed by possible percutaneous bleomycin treatment in the next few months. The patient and his mother will discuss the options for percutaneous bleomycin therapy for long-term control and make a decision in the interim to schedule his 3-month follow-up cerebral angiogram status post the latest stage of embolization.\"    Patient's mother to be called to schedule surgery.

## 2024-07-06 NOTE — TELEPHONE ENCOUNTER
Noted that surgery date has been offered to patient's mother Oriana and accepted today:    Unruly is scheduled for a Diagnostic Cerebral Angiogram with bleomycin interstitial therapy on 8.26.24 at [facility] with .      Please see \"Schedule Surgery\" TE initiated on 5/28/24 for additional information regarding surgical planning.

## 2024-08-16 NOTE — PAT NURSING NOTE
PreOp Instructions     You are scheduled for: an Neuro Interventional Radiology Procedure     Date of Procedure: 08/26/24. CHECK IN AT 7:00 AM     Diet Instructions: Do not eat or drink anything for 8 hours before the procedure. NO FOOD AFTER 10:00 PM     Medications: Medications you are allowed to take can be taken with a sip of water the morning of your procedure     Medications to Stop: Hold herbal supplements and vitamins 1 week before procedure     Contrast Allergy: Please take your allergy medications as instructed     Other Contrast Allergy Instructions: PREDNISONE 50 mg AT 7:30 PM, 1:30 AM AND 7:30 AM ALONG WITH BENADRYL 25 mg    Skin Prep: Shower with antibacterial soap using a clean washcloth, prior to procedure     Discharge Teaching: Your nurse will give you specific instructions before discharge, Most people can resume normal activities in 2-3 days, Any questions, please call the physician's office

## 2024-08-20 ENCOUNTER — LAB ENCOUNTER (OUTPATIENT)
Dept: LAB | Facility: HOSPITAL | Age: 29
End: 2024-08-20
Payer: MEDICAID

## 2024-08-20 DIAGNOSIS — Q27.30 AVM (ARTERIOVENOUS MALFORMATION) (HCC): ICD-10-CM

## 2024-08-20 LAB
ALBUMIN SERPL-MCNC: 4.6 G/DL (ref 3.2–4.8)
ALBUMIN/GLOB SERPL: 1.4 {RATIO} (ref 1–2)
ALP LIVER SERPL-CCNC: 95 U/L
ALT SERPL-CCNC: 26 U/L
ANION GAP SERPL CALC-SCNC: 5 MMOL/L (ref 0–18)
APTT PPP: 28.8 SECONDS (ref 23–36)
AST SERPL-CCNC: 22 U/L (ref ?–34)
BASOPHILS # BLD AUTO: 0.12 X10(3) UL (ref 0–0.2)
BASOPHILS NFR BLD AUTO: 1.4 %
BILIRUB SERPL-MCNC: 0.4 MG/DL (ref 0.3–1.2)
BUN BLD-MCNC: 11 MG/DL (ref 9–23)
BUN/CREAT SERPL: 9.7 (ref 10–20)
CALCIUM BLD-MCNC: 9.9 MG/DL (ref 8.7–10.4)
CHLORIDE SERPL-SCNC: 108 MMOL/L (ref 98–112)
CO2 SERPL-SCNC: 31 MMOL/L (ref 21–32)
CREAT BLD-MCNC: 1.13 MG/DL
DEPRECATED RDW RBC AUTO: 40.7 FL (ref 35.1–46.3)
EGFRCR SERPLBLD CKD-EPI 2021: 90 ML/MIN/1.73M2 (ref 60–?)
EOSINOPHIL # BLD AUTO: 0.22 X10(3) UL (ref 0–0.7)
EOSINOPHIL NFR BLD AUTO: 2.6 %
ERYTHROCYTE [DISTWIDTH] IN BLOOD BY AUTOMATED COUNT: 12.5 % (ref 11–15)
FASTING STATUS PATIENT QL REPORTED: NO
GLOBULIN PLAS-MCNC: 3.2 G/DL (ref 2–3.5)
GLUCOSE BLD-MCNC: 84 MG/DL (ref 70–99)
HCT VFR BLD AUTO: 46.3 %
HGB BLD-MCNC: 15.6 G/DL
IMM GRANULOCYTES # BLD AUTO: 0.02 X10(3) UL (ref 0–1)
IMM GRANULOCYTES NFR BLD: 0.2 %
INR BLD: 0.91 (ref 0.8–1.2)
LYMPHOCYTES # BLD AUTO: 2.41 X10(3) UL (ref 1–4)
LYMPHOCYTES NFR BLD AUTO: 28.9 %
MCH RBC QN AUTO: 29.5 PG (ref 26–34)
MCHC RBC AUTO-ENTMCNC: 33.7 G/DL (ref 31–37)
MCV RBC AUTO: 87.7 FL
MONOCYTES # BLD AUTO: 0.64 X10(3) UL (ref 0.1–1)
MONOCYTES NFR BLD AUTO: 7.7 %
NEUTROPHILS # BLD AUTO: 4.93 X10 (3) UL (ref 1.5–7.7)
NEUTROPHILS # BLD AUTO: 4.93 X10(3) UL (ref 1.5–7.7)
NEUTROPHILS NFR BLD AUTO: 59.2 %
OSMOLALITY SERPL CALC.SUM OF ELEC: 297 MOSM/KG (ref 275–295)
PLATELET # BLD AUTO: 285 10(3)UL (ref 150–450)
POTASSIUM SERPL-SCNC: 4 MMOL/L (ref 3.5–5.1)
PROT SERPL-MCNC: 7.8 G/DL (ref 5.7–8.2)
PROTHROMBIN TIME: 12.8 SECONDS (ref 11.6–14.8)
RBC # BLD AUTO: 5.28 X10(6)UL
SODIUM SERPL-SCNC: 144 MMOL/L (ref 136–145)
WBC # BLD AUTO: 8.3 X10(3) UL (ref 4–11)

## 2024-08-20 PROCEDURE — 80053 COMPREHEN METABOLIC PANEL: CPT

## 2024-08-20 PROCEDURE — 85730 THROMBOPLASTIN TIME PARTIAL: CPT

## 2024-08-20 PROCEDURE — 86900 BLOOD TYPING SEROLOGIC ABO: CPT

## 2024-08-20 PROCEDURE — 86901 BLOOD TYPING SEROLOGIC RH(D): CPT

## 2024-08-20 PROCEDURE — 85025 COMPLETE CBC W/AUTO DIFF WBC: CPT

## 2024-08-20 PROCEDURE — 36415 COLL VENOUS BLD VENIPUNCTURE: CPT

## 2024-08-20 PROCEDURE — 85610 PROTHROMBIN TIME: CPT

## 2024-08-20 PROCEDURE — 86850 RBC ANTIBODY SCREEN: CPT

## 2024-08-21 ENCOUNTER — PATIENT MESSAGE (OUTPATIENT)
Dept: SURGERY | Facility: CLINIC | Age: 29
End: 2024-08-21

## 2024-08-21 LAB
ANTIBODY SCREEN: NEGATIVE
RH BLOOD TYPE: POSITIVE

## 2024-08-21 RX ORDER — PREDNISONE 10 MG/1
TABLET ORAL
Qty: 15 TABLET | Refills: 0 | Status: SHIPPED
Start: 2024-08-26

## 2024-08-21 RX ORDER — DIPHENHYDRAMINE HCL 50 MG
50 CAPSULE ORAL ONCE
Qty: 1 CAPSULE | Refills: 0 | Status: SHIPPED
Start: 2024-08-26 | End: 2024-08-26

## 2024-08-21 NOTE — TELEPHONE ENCOUNTER
Message received from pt mom Oriana.    Acknowledged and appreciative of message and staff.    Nothing needed further with this encounter.

## 2024-08-21 NOTE — TELEPHONE ENCOUNTER
Message below noted.     Prescription orders placed on our end and sent to Pharmacy on file.     Advise pt to let us know if they are for some reason not there.     [facility] 5.28.24  \"Assessment/Plan:   Unruly Guzman is a 29 year old male presents status post stage 6 transarterial ethanol embolization of a diffuse infiltrating right frontal-parietal scalp AVM/AVF.  The patient has  tolerated these multiple embolization procedures very well with no significant complications of skin/soft tissue necrosis and remains neurologically intact.  Furthermore, there has been notable angiographic response with involution of the scalp AVM/AVF as well as clinical improvement with resolved pulsatile tinnitus and decreased scalp soft tissue prominence/erythema, though residual lesion and a palpable thrill remains.      We counseled him and his family that complete cure of diffuse head and neck AVM/AVF pathology is not usually possible and requires multiple staged embolization treatments, serial angiographic monitoring for recurrence/recruitment, and adjunctive multi-disciplinary therapies.  At this point, the patient and his mother (POA) agrees that we have obtained adequate control/response and will defer any further trans-arterial embolization attempts at further involution of the scalp AVM/AVF.  However, percutaneous bleomycin interstitial therapy has been shown to be effective in even high flow vascular pathology and may be an optimum strategy at this stage with a minimally invasive and low risk approach. We also believe that further trans-arterial ethanol embolization can be offered  if there is interval symptomatic or marked angiographic recurrence in the future, but this may be require more aggressive embolization with greater risk to the scalp soft tissues.      We described the procedure, indication, benefits, risk/complications, and alternatives.  The patient and his mother agreed to our plan and will schedule the patient for a  follow-up cerebral angiogram followed by possible percutaneous bleomycin treatment in the next few months. The patient and his mother will discuss the options for percutaneous bleomycin therapy for long-term control and make a decision in the interim to schedule his 3-month follow-up cerebral angiogram status post the latest stage of embolization.\"

## 2024-08-21 NOTE — TELEPHONE ENCOUNTER
Noted that patient's mother has messaged and is asking for a new medication order, as she has contacted the pharmacy and they informed her that they have have not received the order as of today.      Called pharmacist who stated that they will release the medications and patient's POA may come in this afternoon and  meds.

## 2024-08-21 NOTE — TELEPHONE ENCOUNTER
From: Mark Anaya  To: Jose Maria Tovar  Sent: 8/21/2024 11:00 AM CDT  Subject: Prednisone & Benadryl Rx’s    Hello -     Were the Rx’s sent to the pharmacy yet? If not, is it possible to get it sent & filled by Friday in case there is an issue with the pharmacy? Procedure is Monday.

## 2024-08-26 ENCOUNTER — HOSPITAL ENCOUNTER (OUTPATIENT)
Dept: INTERVENTIONAL RADIOLOGY/VASCULAR | Facility: HOSPITAL | Age: 29
Discharge: HOME | End: 2024-08-26
Payer: MEDICAID

## 2024-08-26 ENCOUNTER — ANESTHESIA EVENT (OUTPATIENT)
Dept: INTERVENTIONAL RADIOLOGY/VASCULAR | Facility: HOSPITAL | Age: 29
End: 2024-08-26
Payer: MEDICAID

## 2024-08-26 VITALS
HEIGHT: 65 IN | RESPIRATION RATE: 13 BRPM | SYSTOLIC BLOOD PRESSURE: 115 MMHG | OXYGEN SATURATION: 94 % | TEMPERATURE: 98 F | DIASTOLIC BLOOD PRESSURE: 74 MMHG | WEIGHT: 167 LBS | BODY MASS INDEX: 27.82 KG/M2 | HEART RATE: 69 BPM

## 2024-08-26 DIAGNOSIS — Q27.30 AVM (ARTERIOVENOUS MALFORMATION) (HCC): ICD-10-CM

## 2024-08-26 PROCEDURE — 36226 PLACE CATH VERTEBRAL ART: CPT

## 2024-08-26 PROCEDURE — 36227 PLACE CATH XTRNL CAROTID: CPT

## 2024-08-26 PROCEDURE — 76377 3D RENDER W/INTRP POSTPROCES: CPT

## 2024-08-26 PROCEDURE — 36224 PLACE CATH CAROTD ART: CPT

## 2024-08-26 PROCEDURE — B31DYZZ FLUOROSCOPY OF RIGHT VERTEBRAL ARTERY USING OTHER CONTRAST: ICD-10-PCS

## 2024-08-26 PROCEDURE — B316YZZ FLUOROSCOPY OF RIGHT INTERNAL CAROTID ARTERY USING OTHER CONTRAST: ICD-10-PCS

## 2024-08-26 PROCEDURE — B319YZZ FLUOROSCOPY OF RIGHT EXTERNAL CAROTID ARTERY USING OTHER CONTRAST: ICD-10-PCS

## 2024-08-26 RX ORDER — ALCOHOL 0.98 ML/ML
INJECTION INTRASPINAL
Status: DISCONTINUED
Start: 2024-08-26 | End: 2024-08-26 | Stop reason: WASHOUT

## 2024-08-26 RX ORDER — MIDAZOLAM HYDROCHLORIDE 1 MG/ML
INJECTION INTRAMUSCULAR; INTRAVENOUS AS NEEDED
Status: DISCONTINUED | OUTPATIENT
Start: 2024-08-26 | End: 2024-08-26 | Stop reason: SURG

## 2024-08-26 RX ORDER — LABETALOL HYDROCHLORIDE 5 MG/ML
10 INJECTION, SOLUTION INTRAVENOUS EVERY 10 MIN PRN
OUTPATIENT
Start: 2024-08-26

## 2024-08-26 RX ORDER — ACETAMINOPHEN 500 MG
1000 TABLET ORAL ONCE AS NEEDED
Status: DISCONTINUED | OUTPATIENT
Start: 2024-08-26 | End: 2024-08-26

## 2024-08-26 RX ORDER — PROCHLORPERAZINE EDISYLATE 5 MG/ML
5 INJECTION INTRAMUSCULAR; INTRAVENOUS EVERY 8 HOURS PRN
OUTPATIENT
Start: 2024-08-26

## 2024-08-26 RX ORDER — ACETAMINOPHEN 650 MG/1
650 SUPPOSITORY RECTAL EVERY 4 HOURS PRN
OUTPATIENT
Start: 2024-08-26

## 2024-08-26 RX ORDER — IODIXANOL 320 MG/ML
150 INJECTION, SOLUTION INTRAVASCULAR
Status: COMPLETED | OUTPATIENT
Start: 2024-08-26 | End: 2024-08-26

## 2024-08-26 RX ORDER — DEXAMETHASONE SODIUM PHOSPHATE 4 MG/ML
VIAL (ML) INJECTION AS NEEDED
Status: DISCONTINUED | OUTPATIENT
Start: 2024-08-26 | End: 2024-08-26 | Stop reason: SURG

## 2024-08-26 RX ORDER — NITROGLYCERIN 20 MG/100ML
INJECTION INTRAVENOUS
Status: COMPLETED
Start: 2024-08-26 | End: 2024-08-26

## 2024-08-26 RX ORDER — HYDROMORPHONE HYDROCHLORIDE 1 MG/ML
0.6 INJECTION, SOLUTION INTRAMUSCULAR; INTRAVENOUS; SUBCUTANEOUS EVERY 5 MIN PRN
Status: DISCONTINUED | OUTPATIENT
Start: 2024-08-26 | End: 2024-08-26

## 2024-08-26 RX ORDER — HYDROCODONE BITARTRATE AND ACETAMINOPHEN 5; 325 MG/1; MG/1
1 TABLET ORAL ONCE AS NEEDED
Status: DISCONTINUED | OUTPATIENT
Start: 2024-08-26 | End: 2024-08-26

## 2024-08-26 RX ORDER — HYDROMORPHONE HYDROCHLORIDE 1 MG/ML
0.2 INJECTION, SOLUTION INTRAMUSCULAR; INTRAVENOUS; SUBCUTANEOUS EVERY 5 MIN PRN
Status: DISCONTINUED | OUTPATIENT
Start: 2024-08-26 | End: 2024-08-26

## 2024-08-26 RX ORDER — ONDANSETRON 2 MG/ML
4 INJECTION INTRAMUSCULAR; INTRAVENOUS EVERY 6 HOURS PRN
Status: DISCONTINUED | OUTPATIENT
Start: 2024-08-26 | End: 2024-08-26

## 2024-08-26 RX ORDER — ACETAMINOPHEN 325 MG/1
650 TABLET ORAL EVERY 4 HOURS PRN
OUTPATIENT
Start: 2024-08-26

## 2024-08-26 RX ORDER — ONDANSETRON 2 MG/ML
INJECTION INTRAMUSCULAR; INTRAVENOUS AS NEEDED
Status: DISCONTINUED | OUTPATIENT
Start: 2024-08-26 | End: 2024-08-26 | Stop reason: SURG

## 2024-08-26 RX ORDER — HYDROMORPHONE HYDROCHLORIDE 1 MG/ML
0.4 INJECTION, SOLUTION INTRAMUSCULAR; INTRAVENOUS; SUBCUTANEOUS EVERY 5 MIN PRN
Status: DISCONTINUED | OUTPATIENT
Start: 2024-08-26 | End: 2024-08-26

## 2024-08-26 RX ORDER — NALOXONE HYDROCHLORIDE 0.4 MG/ML
0.08 INJECTION, SOLUTION INTRAMUSCULAR; INTRAVENOUS; SUBCUTANEOUS AS NEEDED
Status: DISCONTINUED | OUTPATIENT
Start: 2024-08-26 | End: 2024-08-26

## 2024-08-26 RX ORDER — SODIUM CHLORIDE, SODIUM LACTATE, POTASSIUM CHLORIDE, CALCIUM CHLORIDE 600; 310; 30; 20 MG/100ML; MG/100ML; MG/100ML; MG/100ML
INJECTION, SOLUTION INTRAVENOUS CONTINUOUS
Status: DISCONTINUED | OUTPATIENT
Start: 2024-08-26 | End: 2024-08-26

## 2024-08-26 RX ORDER — SODIUM CHLORIDE 9 MG/ML
INJECTION, SOLUTION INTRAVENOUS CONTINUOUS PRN
Status: DISCONTINUED | OUTPATIENT
Start: 2024-08-26 | End: 2024-08-26 | Stop reason: SURG

## 2024-08-26 RX ORDER — ONDANSETRON 2 MG/ML
4 INJECTION INTRAMUSCULAR; INTRAVENOUS EVERY 6 HOURS PRN
OUTPATIENT
Start: 2024-08-26

## 2024-08-26 RX ORDER — VERAPAMIL HYDROCHLORIDE 2.5 MG/ML
INJECTION, SOLUTION INTRAVENOUS
Status: COMPLETED
Start: 2024-08-26 | End: 2024-08-26

## 2024-08-26 RX ORDER — HYDROCODONE BITARTRATE AND ACETAMINOPHEN 5; 325 MG/1; MG/1
2 TABLET ORAL ONCE AS NEEDED
Status: DISCONTINUED | OUTPATIENT
Start: 2024-08-26 | End: 2024-08-26

## 2024-08-26 RX ORDER — PHENYLEPHRINE HCL 10 MG/ML
VIAL (ML) INJECTION AS NEEDED
Status: DISCONTINUED | OUTPATIENT
Start: 2024-08-26 | End: 2024-08-26 | Stop reason: SURG

## 2024-08-26 RX ORDER — ALCOHOL 0.98 ML/ML
5 INJECTION INTRASPINAL ONCE
Status: DISCONTINUED | OUTPATIENT
Start: 2024-08-26 | End: 2024-08-26

## 2024-08-26 RX ORDER — HEPARIN SODIUM 5000 [USP'U]/ML
INJECTION, SOLUTION INTRAVENOUS; SUBCUTANEOUS
Status: COMPLETED
Start: 2024-08-26 | End: 2024-08-26

## 2024-08-26 RX ORDER — LIDOCAINE HYDROCHLORIDE 10 MG/ML
INJECTION, SOLUTION INFILTRATION; PERINEURAL
Status: COMPLETED
Start: 2024-08-26 | End: 2024-08-26

## 2024-08-26 RX ADMIN — PHENYLEPHRINE HCL 100 MCG: 10 MG/ML VIAL (ML) INJECTION at 14:00:00

## 2024-08-26 RX ADMIN — PHENYLEPHRINE HCL 100 MCG: 10 MG/ML VIAL (ML) INJECTION at 13:10:00

## 2024-08-26 RX ADMIN — MIDAZOLAM HYDROCHLORIDE 1 MG: 1 INJECTION INTRAMUSCULAR; INTRAVENOUS at 12:35:00

## 2024-08-26 RX ADMIN — SODIUM CHLORIDE: 9 INJECTION, SOLUTION INTRAVENOUS at 12:26:00

## 2024-08-26 RX ADMIN — MIDAZOLAM HYDROCHLORIDE 1 MG: 1 INJECTION INTRAMUSCULAR; INTRAVENOUS at 12:32:00

## 2024-08-26 RX ADMIN — ONDANSETRON 4 MG: 2 INJECTION INTRAMUSCULAR; INTRAVENOUS at 14:20:00

## 2024-08-26 RX ADMIN — PHENYLEPHRINE HCL 100 MCG: 10 MG/ML VIAL (ML) INJECTION at 13:32:00

## 2024-08-26 RX ADMIN — DEXAMETHASONE SODIUM PHOSPHATE 4 MG: 4 MG/ML VIAL (ML) INJECTION at 12:53:00

## 2024-08-26 RX ADMIN — PHENYLEPHRINE HCL 100 MCG: 10 MG/ML VIAL (ML) INJECTION at 14:16:00

## 2024-08-26 RX ADMIN — IODIXANOL 80 ML: 320 INJECTION, SOLUTION INTRAVASCULAR at 14:38:00

## 2024-08-26 RX ADMIN — PHENYLEPHRINE HCL 100 MCG: 10 MG/ML VIAL (ML) INJECTION at 13:38:00

## 2024-08-26 NOTE — ANESTHESIA POSTPROCEDURE EVALUATION
[facility]    Mark Anaya Patient Status:  Outpatient   Age/Gender 29 year old male MRN [id number]   Location [facility] POST ANESTHESIA CARE UNIT Attending Jose Maria Tovar MD, PhD   Hosp Day # 0 PCP Porfirio Roca MD       Anesthesia Post-op Note        Procedure Summary       Date: 08/26/24 Room / Location: [facility] Interventional Suites    Anesthesia Start: 1226 Anesthesia Stop: 1443    Procedure: IVS NEURO Diagnosis:       AVM (arteriovenous malformation) (HCC)      AVM (arteriovenous malformation) (HCC)    Scheduled Providers: Sierra Blanchard MD Anesthesiologist: Sierra Blanchard MD    Anesthesia Type: MAC ASA Status: 3            Anesthesia Type: MAC    Vitals Value Taken Time   /67 08/26/24 1545   Temp 98.2 °F (36.8 °C) 08/26/24 1510   Pulse 84 08/26/24 1545   Resp 18 08/26/24 1542   SpO2 97 % 08/26/24 1545   Vitals shown include unfiled device data.    Patient Location: PACU    Anesthesia Type: general    Airway Patency: patent    Postop Pain Control: adequate    Mental Status: mildly sedated but able to meaningfully participate in the post-anesthesia evaluation    Nausea/Vomiting: none    Cardiopulmonary/Hydration status: stable euvolemic    Complications: no apparent anesthesia related complications    Postop vital signs: stable    Comments: Hand off to receiving nurse completed with essential components of patient's care reviewed, RN expressed comfort with assuming care, and all questions answered.     Dental Exam: Unchanged from Preop    Patient to be discharged from PACU when criteria met.

## 2024-08-26 NOTE — ANESTHESIA PREPROCEDURE EVALUATION
PRE-OP EVALUATION    Patient Name: Mark Anaya    Admit Diagnosis: AVM (arteriovenous malformation) (HCC) [Q27.30]    Pre-op Diagnosis: * No pre-op diagnosis entered *        Anesthesia Procedure: IVS NEURO    * No surgeons found in log *    Pre-op vitals reviewed.  Temp: 97.4 °F (36.3 °C)  Pulse: 94  Resp: 14  BP: 132/86  SpO2: 98 %  Body mass index is 27.79 kg/m².    Current medications reviewed.  Hospital Medications:   bleomycin (Bleocin) 15 Units in sodium chloride 0.9% PF 10 mL INTRALESIONAL syringe  15 Units Intralesional Once    ethyl alcohol (Ablysinol) 99% injection  5 mL Intraneural Once    [COMPLETED] heparin (Porcine) 5000 UNIT/ML injection        [COMPLETED] heparin (Porcine) 5000 UNIT/ML injection        [COMPLETED] heparin (Porcine) 5000 UNIT/ML injection        [COMPLETED] nitroGLYCERIN (Nitrobid) 2 % ointment        [COMPLETED] verapamil (Isoptin) 2.5 mg/mL injection        [COMPLETED] Nitroglycerin in D5W 200-5 MCG/ML-% injection           Outpatient Medications:     Medications Prior to Admission   Medication Sig Dispense Refill Last Dose    predniSONE 10 MG Oral Tab Patient to take 50 mg 13 hrs prior to exam, 50 mg 7 hours prior to exam and 50 mg 1 hr prior to exam. 15 tablet 0 8/26/2024 at 0930    diphenhydrAMINE 50 MG Oral Cap Take 1 capsule (50 mg total) by mouth one time for 1 dose. 1 capsule 0 8/26/2024 at 0930    Docusate Sodium (COLACE OR) Take by mouth daily.   8/25/2024    sertraline 25 MG Oral Tab    8/25/2024    Amphetamine-Dextroamphet ER 10 MG Oral Capsule SR 24 Hr Take 1 capsule (10 mg total) by mouth every morning.   8/25/2024 at am    Sertraline HCl 50 MG Oral Tab Take 1.5 tablets (75 mg total) by mouth daily.   8/25/2024    predniSONE 10 MG Oral Tab Patient to take 50 mg 13 hrs prior to exam, 50 mg 7 hours prior to exam and 50 mg 1 hr prior to exam. 15 tablet 0     diphenhydrAMINE 50 MG Oral Cap Take 1 capsule (50 mg total) by mouth one time for 1 dose. 1 capsule 0      Clindamycin Phos-Benzoyl Perox (BENZACLIN WITH PUMP) 1-5 % External Gel 1 Application As Directed.       Multiple Vitamins-Minerals (MULTI-VITAMIN/MINERALS) Oral Tab Take 1 tablet by mouth daily.   8/19/2024       Allergies: Radiology contrast iodinated dyes      Anesthesia Evaluation    Patient summary reviewed.    Anesthetic Complications  (+) history of anesthetic complications  History of: PONV       GI/Hepatic/Renal    Negative GI/hepatic/renal ROS.                             Cardiovascular    Negative cardiovascular ROS.    Exercise tolerance: good     MET: >4    (-) obesity                                       Endo/Other    Negative endo/other ROS.                              Pulmonary    Negative pulmonary ROS.                       Neuro/Psych        (+) anxiety                         Attention deficit hyperactivity disorder (ADHD), unspecified ADHD type     Conversion disorder     Generalized anxiety disorder     Autism (HCC)     AVM (arteriovenous malformation) (HCC)     Acute left ankle pain     Other acne     AVF (arteriovenous fistula) (HCC)     Leukocytosis     Hypophosphatemia     Sinus tachycardia            Past Surgical History:   Procedure Laterality Date    Other surgical history      embolization of AVM     Social History     Socioeconomic History    Marital status: Single   Tobacco Use    Smoking status: Never    Smokeless tobacco: Never   Vaping Use    Vaping status: Never Used   Substance and Sexual Activity    Alcohol use: Never    Drug use: Never     History   Drug Use Unknown     Available pre-op labs reviewed.  Lab Results   Component Value Date    WBC 8.3 08/20/2024    RBC 5.28 08/20/2024    HGB 15.6 08/20/2024    HCT 46.3 08/20/2024    MCV 87.7 08/20/2024    MCH 29.5 08/20/2024    MCHC 33.7 08/20/2024    RDW 12.5 08/20/2024    .0 08/20/2024     Lab Results   Component Value Date     08/20/2024    K 4.0 08/20/2024     08/20/2024    CO2 31.0 08/20/2024    BUN 11  08/20/2024    CREATSERUM 1.13 08/20/2024    GLU 84 08/20/2024    CA 9.9 08/20/2024     Lab Results   Component Value Date    INR 0.91 08/20/2024         Airway      Mallampati: III  Mouth opening: 3 FB  TM distance: 4 - 6 cm  Neck ROM: full Cardiovascular    Cardiovascular exam normal.         Dental  Comment: Lower retainer. Otherwise, no loose teeth or teeth that appear vulnerable. Given limitations of exam, will reassess post induction and prior to any airway manipulation.                Pulmonary    Pulmonary exam normal.                 Other findings              ASA: 3   Plan: MAC  NPO status verified and patient meets guidelines.        Comment: Plan for MAC for angiogram. If necessary, will convert to GA should PCI of AVM be warranted. Discussion with parents and La will dictate interprocedural course of action. I discussed the rare but serious complications of anesthesia, including but not limited to cardiovascular complications, allergic reaction to medications, pulomary/respiratory complications, post-operative nausea, post-op pain, damage to dentition, aspiration, or sore throat. The patient expressed understanding of plan and agreement.   Premedicated with benadryl and prednisone for allergy  Plan/risks discussed with: patient, father and mother                Present on Admission:  **None**

## 2024-08-26 NOTE — DISCHARGE INSTRUCTIONS
POST NEURO ENDOVASCULAR HOME CARE INSTRUCTIONS    Activity:   DO NOT drive within the first 24 hours after the procedure. You may resume driving on Wednesday.   Plan on resting and relaxing today and tomorrow  Resume your normal activity after 48 hours, or as instructed by your physician   Do not lift anything over 10 pounds for the next 7 days   Avoid sexual activity for the next 24 hours   Avoid drinking alcohol for the next 24 hours   If the wrist was used, avoid bending/flexing of the wrist for the next 24 hours  What is Normal:   A small lump (roughly 2-3 cm) at the procedure site associated with mild tenderness when touched that may last up to a few weeks, but should progressively improve  The procedure site may be bruised or discolored   There may be a small amount of drainage on the bandage   Special Instructions:   Drink plenty of fluids during the next 24 hours to “flush” the contrast from your system   The bandage is to remain in place for 24 hours   Keep the bandage clean and dry   DO NOT submerge the procedure site for 7 days  This includes dishwashing/submersion of the wrist, if the wrist was used   After 24 hours, you must remove the bandage   Ok to shower and wash the procedure site gently with soap and water   Wearing a bandage is not necessary. If you choose to wear a bandage for a few days, make sure it remains clean and dry and that it is changed daily      (Post Neuro Endovascular Home Care Instructions continued)    When you should NOTIFY YOUR PHYSICIAN:   Bleeding can occur at the procedure site - both on the outside of the skin and/or beneath the surface of the skin   Swelling or a large lump at the procedure site can occur, which may be accompanied by moderate to severe pain   If either of the above occurs, lie down flat. Have someone apply pressure to the procedure site with both hands, as instructed by the nurse. Hold pressure for 20 minutes and the bleeding should stop. Notify your  physician of the occurrence   If the bleeding does not stop, call 911 and continue to apply pressure   If you experience signs of a fever, temperature >101°F, chills, infection (redness, swelling, thick yellow drainage, or a foul odor from the procedure site)   If you notice any numbness, tingling, or loss of feeling to your fingers or hand, if wrist access was utilized  Other:   You may resume your present diet, unless otherwise specified by your physician   You may resume all of your medications as prescribed, unless otherwise directed by your physician. A list of your medications was provided to you at discharge  Follow up in clinic as directed.

## 2024-08-26 NOTE — PROGRESS NOTES
Rc'd pt from PACU s/p cerebral angio in stable condition. TR band had no air. It was removed and a pressure dressing was applied. Site is soft, clean and dry. No bleeding or hematoma. Pt denies c/o pain or discomfort. Dr Tovar spoke to pts parents. Pt tolerating po intake well.     16:15: Pt tolerated po intake well. IV removed with catheter intact. Reviewed discharge instructions with pt and parents, verbalizes understanding. Home via w/c in stable condition without c/o. Pts mom is the .

## 2024-08-26 NOTE — H&P
History & Physical Examination    Patient Name: Mark Anaya  MRN: [id number]  CSN: [id number]  YOB: 1995    Diagnosis: right frontal-parietal scalp AVM/AVF     Present Illness: Unruly Anaya is a 29 year old male presents status post stage 6 transarterial ethanol embolization of a diffuse infiltrating right frontal-parietal scalp AVM/AVF.  The patient has  tolerated these multiple embolization procedures very well with no significant complications of skin/soft tissue necrosis and remains neurologically intact.  Furthermore, there has been notable angiographic response with involution of the scalp AVM/AVF as well as clinical improvement with resolved pulsatile tinnitus and decreased scalp soft tissue prominence/erythema, though residual lesion and a palpable thrill remains.      We counseled him and his family that complete cure of diffuse head and neck AVM/AVF pathology is not usually possible and requires multiple staged embolization treatments, serial angiographic monitoring for recurrence/recruitment, and adjunctive multi-disciplinary therapies.  At this point, the patient and his mother (POA) agrees that we have obtained adequate control/response and will defer any further trans-arterial embolization attempts at further involution of the scalp AVM/AVF.  However, percutaneous bleomycin interstitial therapy has been shown to be effective in even high flow vascular pathology and may be an optimum strategy at this stage with a minimally invasive and low risk approach. We also believe that further trans-arterial ethanol embolization can be offered  if there is interval symptomatic or marked angiographic recurrence in the future, but this may be require more aggressive embolization with greater risk to the scalp soft tissues.      We described the procedure, indication, benefits, risk/complications, and alternatives.  The patient and his mother agreed to our plan and will schedule the patient  for a follow-up cerebral angiogram followed by possible percutaneous bleomycin treatment in the next few months. The patient and his mother will discuss the options for percutaneous bleomycin therapy for long-term control and make a decision in the interim to schedule his 3-month follow-up cerebral angiogram status post the latest stage of embolization.       Has been NPO since midnight 8/26  Labs from 8/20 reviewed: BMP, CBC, PT/INR.    Current Facility-Administered Medications   Medication Dose Route Frequency    bleomycin (Bleocin) 15 Units in sodium chloride 0.9% PF 10 mL INTRALESIONAL syringe  15 Units Intralesional Once    ethyl alcohol (Ablysinol) 99% injection  5 mL Intraneural Once       Allergies:   Allergies   Allergen Reactions    Radiology Contrast Iodinated Dyes SWELLING       Past Medical History:    ADHD    Anxiety    Autism (HCC)    Autism spectrum disorder (HCC)    AVM (arteriovenous malformation) (HCC)     Past Surgical History:   Procedure Laterality Date    Other surgical history      embolization of AVM     Family History   Problem Relation Age of Onset    Stroke Maternal Grandmother     Colon Cancer Maternal Grandfather     Diabetes Maternal Grandfather     Cancer Paternal Grandfather         pancreatic     Social History     Tobacco Use    Smoking status: Never    Smokeless tobacco: Never   Substance Use Topics    Alcohol use: Never       Neurological Exam:  Mental status: Oriented to person, place, and time   Speech: Fluent, no dysarthria  CN: II-XII grossly intact  Memory and comprehension: Intact   Motor: No drift, no focal arm or leg weakness.   Sensory: Intact to light touch    [ x ] I have reviewed the History and Physical done within the last 30 days.  Any changes noted above.    Neela Monk, APRN  8/26/2024, 10:35 AM  [place] [id number]

## 2024-08-27 ENCOUNTER — PATIENT MESSAGE (OUTPATIENT)
Dept: SURGERY | Facility: CLINIC | Age: 29
End: 2024-08-27

## 2024-08-27 NOTE — TELEPHONE ENCOUNTER
From: Mark Anaya  To: Jose Maria Tovar  Sent: 8/27/2024 11:34 AM CDT  Subject: Eye sensitivity to light    Tiffani Tolliver had his angiogram yesterday and was discharged. He’s been experiencing watery eyes and high sensitivity to light since he came out of recovery. It’s still happening today. Just want to ask if this is normal? It’s never happened before.    Thank you,  Oriana (mom)

## 2024-08-27 NOTE — TELEPHONE ENCOUNTER
Dr. Tovar and I called the patient's mom, Oriana to discuss her concerns.     Mom states that the patient's light sensitivity has improved already since her initial message.     Instructed mom to reach out with any worsening of symptoms. She stated understanding and appreciation for the call.      Please be advised.     Thank you!

## 2024-08-27 NOTE — TELEPHONE ENCOUNTER
Noted that patient's mother has messaged asking if light sensitivity and watery eyes would be an expected side effect from diagnostic cerebral angiogram.  Patient underwent procedure on 8/26/24.     Routed to RADHA Mcgee

## 2024-09-16 ENCOUNTER — APPOINTMENT (OUTPATIENT)
Dept: GENERAL RADIOLOGY | Facility: HOSPITAL | Age: 29
End: 2024-09-16
Payer: MEDICAID

## 2024-09-16 ENCOUNTER — HOSPITAL ENCOUNTER (EMERGENCY)
Facility: HOSPITAL | Age: 29
Discharge: HOME | End: 2024-09-16
Attending: EMERGENCY MEDICINE
Payer: MEDICAID

## 2024-09-16 ENCOUNTER — APPOINTMENT (OUTPATIENT)
Dept: CT IMAGING | Facility: HOSPITAL | Age: 29
End: 2024-09-16
Attending: EMERGENCY MEDICINE
Payer: MEDICAID

## 2024-09-16 VITALS
RESPIRATION RATE: 18 BRPM | OXYGEN SATURATION: 100 % | HEART RATE: 84 BPM | DIASTOLIC BLOOD PRESSURE: 79 MMHG | SYSTOLIC BLOOD PRESSURE: 121 MMHG | TEMPERATURE: 97 F

## 2024-09-16 DIAGNOSIS — S52.134A CLOSED NONDISPLACED FRACTURE OF NECK OF RIGHT RADIUS, INITIAL ENCOUNTER: Primary | ICD-10-CM
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
## 2024-09-16 DIAGNOSIS — S09.90XA INJURY OF HEAD, INITIAL ENCOUNTER: ICD-10-CM

## 2024-09-16 DIAGNOSIS — T14.8XXA ABRASION: ICD-10-CM

## 2024-09-16 PROCEDURE — 99284 EMERGENCY DEPT VISIT MOD MDM: CPT

## 2024-09-16 PROCEDURE — 70450 CT HEAD/BRAIN W/O DYE: CPT | Performed by: EMERGENCY MEDICINE

## 2024-09-16 PROCEDURE — 73090 X-RAY EXAM OF FOREARM: CPT | Performed by: EMERGENCY MEDICINE

## 2024-09-16 RX ORDER — IBUPROFEN 600 MG/1
600 TABLET, FILM COATED ORAL ONCE
Status: COMPLETED | OUTPATIENT
Start: 2024-09-16 | End: 2024-09-16
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
## 2025-05-27 ENCOUNTER — PATIENT MESSAGE (OUTPATIENT)
Dept: SURGERY | Facility: CLINIC | Age: 30
End: 2025-05-27

## 2025-05-27 NOTE — TELEPHONE ENCOUNTER
Noted that patient's mother has messaged and is asking if patient's plan of care for anticipated serial DCA.     Per Dr. Tovar at office visit on 5.28.24:    \"Assessment/Plan:   Unruly Guzman is a 29 year old male presents status post stage 6 transarterial ethanol embolization of a diffuse infiltrating right frontal-parietal scalp AVM/AVF.  The patient has  tolerated these multiple embolization procedures very well with no significant complications of skin/soft tissue necrosis and remains neurologically intact.  Furthermore, there has been notable angiographic response with involution of the scalp AVM/AVF as well as clinical improvement with resolved pulsatile tinnitus and decreased scalp soft tissue prominence/erythema, though residual lesion and a palpable thrill remains.      We counseled him and his family that complete cure of diffuse head and neck AVM/AVF pathology is not usually possible and requires multiple staged embolization treatments, serial angiographic monitoring for recurrence/recruitment, and adjunctive multi-disciplinary therapies.  At this point, the patient and his mother (POA) agrees that we have obtained adequate control/response and will defer any further trans-arterial embolization attempts at further involution of the scalp AVM/AVF.  However, percutaneous bleomycin interstitial therapy has been shown to be effective in even high flow vascular pathology and may be an optimum strategy at this stage with a minimally invasive and low risk approach. We also believe that further trans-arterial ethanol embolization can be offered  if there is interval symptomatic or marked angiographic recurrence in the future, but this may be require more aggressive embolization with greater risk to the scalp soft tissues.      We described the procedure, indication, benefits, risk/complications, and alternatives.  The patient and his mother agreed to our plan and will schedule the patient for a follow-up cerebral  angiogram followed by possible percutaneous bleomycin treatment in the next few months. The patient and his mother will discuss the options for percutaneous bleomycin therapy for long-term control and make a decision in the interim to schedule his 3-month follow-up cerebral angiogram status post the latest stage of embolization.\"    Routed to BAR Mcgee.

## 2025-05-30 NOTE — TELEPHONE ENCOUNTER
Discussed with Dr. Tovar.     Called and spoke with the patient's mom, Oriana. At this time, they wish to hold off on a DCA, as Unruly is not experiencing any related symptoms (skin discoloration, tinnitus, pain, etc)     All questions/concerns were addressed.     I advised her to reach back out if they change their mind regarding potentially pursuing further treatment, potentially with bleomycin.     Mom stated understanding and appreciation for the call.

## 2025-05-30 NOTE — TELEPHONE ENCOUNTER
Noted message below from provider. HAILEY Monk called and spoke directly to patients mom, Oriana and discussed case.   Nothing further needed with this encounter as mother would like to hold off on procedure since patient is asymptomatic at this time.

## 2025-06-18 NOTE — TELEPHONE ENCOUNTER
Please schedule patient for diagnostic cerebral angiogram with possible IA ethanol embolization for March 2024 under general anesthesia with Dr. Tovar.     Pt will need to be pre-medicated for dye allergy, as per previous procedures.     Thank you!   
Reminder placed to call pt's mother in February to scheduled procedure in March with Dr Tovar  
34.5

## (undated) NOTE — LETTER
Date & Time: 9/16/2024, 5:05 PM  Patient: Mark Anaya  Encounter Provider(s):    Skylar Chaudhary MD       To Whom It May Concern:    Mark Anaya was seen and treated in our department on 9/16/2024. He should not return to work until cleared by [facility] .    If you have any questions or concerns, please do not hesitate to call.        _____________________________  Physician/APC Signature

## (undated) NOTE — LETTER
Date & Time: 9/16/2024, 5:05 PM  Patient: Mark Anaya  Encounter Provider(s):    Skylar Chaudhary MD       Occupational restrictions  For: Mark Anaya    {WC Weight restrictions:6515::\"Normal weight limits\"}  {WC Upper Extremity restrictions:6517::\"Normal arm and hand activities\"}  {WC Lower Extremity restrictions:6518::\"Normal leg activities\"}  {WC Back restrictions:6519::\"Normal back activities\"}  {WC Wound restrictions:6520::\"No wound\"}  {WC Miscellaneous restrictions:6521::\"-\"}        _____________________________  Physician/APC Signature

## (undated) NOTE — LETTER
22    RE: Hedy Howard     : 1995    Dear Dr. Rayna Gibson,    This letter is to inform you that your patient is being scheduled for surgery with Dr. Misael Ledezma on 23 at [facility]. We have asked the patient to contact your office to schedule a pre-operative exam/visit. Diagnosis:  superficial right frontal-parietal extracranial AVM/AVF  Procedure: (Arteriovenous) percutaneous or transverse embolization     A Pre-operative History & Physical is needed for medical clearance. Please address patient's active problems (i.e high blood pressure, breathing issues etc.)  Pre-op labs are ordered to be performed at an [facility] lab. Please fax clearance letter/office visit note to our office at fax #: [phone]. Your office note must clearly indicate if the patient is medically cleared for surgery. The following orders will be placed by pre-admission testing:  CBC  Comprehensive Metabolic Panel  PT/PTT/INR  Covid-19 testing  (*And any other pertinent testing based on patient's current clinical condition.)      If you have any questions, you may contact our office at [phone], option # 1.     Thank you,    Tatyana Mcelroy RN   Clinical Staff Nurse,   [facility]

## (undated) NOTE — LETTER
Date & Time: 11/22/2022, 11:12 AM  Patient: Asaf Gil  Encounter Provider(s):    BAR Perla       To Whom It May Concern:    Clara Hilario was seen and treated in our department on 11/22/2022. He should not return to work until 11/28/2022. If you have any questions or concerns, please do not hesitate to call.        _____________________________  Mark Saleh.  Amrita Bodily